# Patient Record
Sex: FEMALE | Race: BLACK OR AFRICAN AMERICAN | Employment: FULL TIME | URBAN - METROPOLITAN AREA
[De-identification: names, ages, dates, MRNs, and addresses within clinical notes are randomized per-mention and may not be internally consistent; named-entity substitution may affect disease eponyms.]

---

## 2017-05-29 ENCOUNTER — HOSPITAL ENCOUNTER (EMERGENCY)
Age: 22
Discharge: HOME | End: 2017-05-29
Attending: EMERGENCY MEDICINE
Payer: MEDICAID

## 2017-05-29 VITALS
OXYGEN SATURATION: 100 % | RESPIRATION RATE: 18 BRPM | SYSTOLIC BLOOD PRESSURE: 140 MMHG | DIASTOLIC BLOOD PRESSURE: 87 MMHG | HEART RATE: 69 BPM | TEMPERATURE: 98.1 F | WEIGHT: 135 LBS | BODY MASS INDEX: 25.51 KG/M2

## 2017-05-29 DIAGNOSIS — J02.9 ACUTE PHARYNGITIS, UNSPECIFIED ETIOLOGY: Primary | ICD-10-CM

## 2017-05-29 PROCEDURE — 99283 EMERGENCY DEPT VISIT LOW MDM: CPT

## 2017-05-29 PROCEDURE — 74011250637 HC RX REV CODE- 250/637: Performed by: EMERGENCY MEDICINE

## 2017-05-29 RX ORDER — AMOXICILLIN 500 MG/1
500 TABLET, FILM COATED ORAL 3 TIMES DAILY
Qty: 30 TAB | Refills: 0 | Status: SHIPPED | OUTPATIENT
Start: 2017-05-29 | End: 2021-04-01

## 2017-05-29 RX ORDER — AMOXICILLIN 250 MG/1
500 CAPSULE ORAL
Status: COMPLETED | OUTPATIENT
Start: 2017-05-29 | End: 2017-05-29

## 2017-05-29 RX ADMIN — AMOXICILLIN 500 MG: 250 CAPSULE ORAL at 05:51

## 2017-05-29 NOTE — ED PROVIDER NOTES
HPI Comments: Cl Salazar is a 24 y.o. female with pertinent PMHx of asthma presenting ambulatory to the ED c/o aching sore throat x 3 days. Pt states that her pain is increased with swallowing. Pt notes associated symptoms of congestion, bilateral ear pain, headache, generalized myalgias and intermittent rhinorrhea. Pt denies any regular use of medications or any known allergies to medications. Pt denies any history of HTN or DM. Pt denies any chance of pregnancy. Pt specifically denies any cough, SOB or measured fevers. PCP: Kathryn Rosario MD  Social Hx: + tobacco use, - alcohol use, - illicit drug use    There are no other complaints, changes, or physical findings at this time. The history is provided by the patient. No  was used. Past Medical History:   Diagnosis Date    ADD (attention deficit disorder)     Asthma     grew out of it per pt       Past Surgical History:   Procedure Laterality Date    HX  SECTION           Family History:   Problem Relation Age of Onset    Psychiatric Disorder Mother        Social History     Social History    Marital status: SINGLE     Spouse name: N/A    Number of children: N/A    Years of education: N/A     Occupational History    Not on file. Social History Main Topics    Smoking status: Current Every Day Smoker    Smokeless tobacco: Never Used    Alcohol use No      Comment: 3 black and milds a day    Drug use: No    Sexual activity: Not Currently     Other Topics Concern    Not on file     Social History Narrative         ALLERGIES: Review of patient's allergies indicates no known allergies. Review of Systems   Constitutional: Negative for appetite change, chills, diaphoresis, fatigue and fever. HENT: Positive for congestion, ear pain and rhinorrhea. Negative for sore throat and trouble swallowing. Respiratory: Negative for cough and shortness of breath. Cardiovascular: Negative for chest pain. Gastrointestinal: Negative for abdominal pain, diarrhea, nausea and vomiting. Genitourinary: Negative for difficulty urinating, dysuria and frequency. Musculoskeletal: Positive for myalgias (generalized myalgias). Negative for arthralgias and back pain. Skin: Negative for color change and rash. Neurological: Positive for headaches. Negative for weakness and numbness. Hematological: Does not bruise/bleed easily. All other systems reviewed and are negative. Vitals:    05/29/17 0531   BP: 140/87   Pulse: 69   Resp: 18   Temp: 98.1 °F (36.7 °C)   SpO2: 100%   Weight: 61.2 kg (135 lb)            Physical Exam   Constitutional: She is oriented to person, place, and time. She appears well-developed and well-nourished. No distress. HENT:   Head: Normocephalic and atraumatic. Mouth/Throat: Posterior oropharyngeal erythema present. No oropharyngeal exudate. Neck: Normal range of motion and full passive range of motion without pain. Neck supple. Mild cervical adenopathy and TTP   Cardiovascular: Normal rate, regular rhythm, normal heart sounds, intact distal pulses and normal pulses. Exam reveals no gallop and no friction rub. No murmur heard. Pulmonary/Chest: Effort normal and breath sounds normal. No accessory muscle usage. No respiratory distress. She has no decreased breath sounds. She has no wheezes. She has no rhonchi. She has no rales. Abdominal: Soft. Bowel sounds are normal. She exhibits no distension. There is no tenderness. There is no rebound, no guarding and no CVA tenderness. Musculoskeletal: Normal range of motion. She exhibits tenderness. She exhibits no edema. Thoracic back: She exhibits no tenderness and no bony tenderness. Lumbar back: She exhibits no tenderness and no bony tenderness. Mild cervical adenopathy and TTP   Lymphadenopathy:     She has cervical adenopathy (mild). Neurological: She is alert and oriented to person, place, and time.  She has normal strength. She is not disoriented. No cranial nerve deficit or sensory deficit. No focal deficits; 5/5 muscle strength in all extremities   Skin: Skin is warm. No lesion and no rash noted. Rash is not nodular. She is not diaphoretic. Nursing note and vitals reviewed. MDM  Number of Diagnoses or Management Options  Diagnosis management comments: DDx: possible strep throat, possible viral illness, unlikely infectious mononucleosis            Amount and/or Complexity of Data Reviewed  Review and summarize past medical records: yes    Patient Progress  Patient progress: stable    ED Course       Procedures    5:40 AM - Discussed the risks of smoking and the benefits of smoking cessation as well as the long term sequelae of smoking with the pt. The patient verbalized their understanding. MEDICATIONS GIVEN:  Medications   amoxicillin (AMOXIL) capsule 500 mg (not administered)       IMPRESSION:  1. Acute pharyngitis, unspecified etiology        PLAN:  1. Current Discharge Medication List      START taking these medications    Details   amoxicillin 500 mg tab Take 500 mg by mouth three (3) times daily. Qty: 30 Tab, Refills: 0         CONTINUE these medications which have NOT CHANGED    Details   albuterol (PROVENTIL VENTOLIN) 2.5 mg /3 mL (0.083 %) nebulizer solution Take 3 mL by inhalation every four (4) hours as needed for Wheezing. Qty: 25 Each, Refills: 2    Associated Diagnoses: Asthma      albuterol (PROVENTIL HFA, VENTOLIN HFA) 90 mcg/actuation inhaler Take 2 Puffs by inhalation daily as needed. 2.   Follow-up Information     Follow up With Details Comments [address], MD   [address] [facility] [address]  [phone]      Shaina Bentley MD   [address] [facility] and [address] [place] [address].  [phone]          Return to ED if worse   DISCHARGE NOTE:  5:49 AM  The patient is ready for discharge.  The patient's signs, symptoms, diagnosis, and discharge instructions have been discussed and the patient and/or family has conveyed their understanding. The patient and/or family is to follow up as recommended or return to the ER should their symptoms worsen. Plan has been discussed and the patient and/or family is in agreement. Written by Oni Rosario, ED Scribe, as dictated by Macie Roger MD.     Attestation: This note is prepared by Gordon Buerger. Kalie Rosario, acting as Scribe for Macie Roger MD.    Macie Roger MD: The scribe's documentation has been prepared under my direction and personally reviewed by me in its entirety. I confirm that the note above accurately reflects all work, treatment, procedures, and medical decision making performed by me.

## 2017-05-29 NOTE — ED NOTES
Patient (s) was given copy of dc instructions and one paper script(s) and no electronic scripts. Patient (s) has verbalized understanding of instructions and script (s). Patient was given a current medication reconciliation form and verbalized understanding of their medications. Patient (s) has verbalized understanding of the importance of discussing medications with  his or her physician or clinic they will be following up with. Patient alert and oriented and in no acute distress. Patient offered wheelchair from treatment area to hospital entrance, patient declined wheelchair. Patient left ED with self.

## 2017-05-29 NOTE — DISCHARGE INSTRUCTIONS
Sore Throat: Care Instructions  Your Care Instructions    Infection by bacteria or a virus causes most sore throats. Cigarette smoke, dry air, air pollution, allergies, and yelling can also cause a sore throat. Sore throats can be painful and annoying. Fortunately, most sore throats go away on their own. If you have a bacterial infection, your doctor may prescribe antibiotics. Follow-up care is a key part of your treatment and safety. Be sure to make and go to all appointments, and call your doctor if you are having problems. It's also a good idea to know your test results and keep a list of the medicines you take. How can you care for yourself at home? · If your doctor prescribed antibiotics, take them as directed. Do not stop taking them just because you feel better. You need to take the full course of antibiotics. · Gargle with warm salt water once an hour to help reduce swelling and relieve discomfort. Use 1 teaspoon of salt mixed in 1 cup of warm water. · Take an over-the-counter pain medicine, such as acetaminophen (Tylenol), ibuprofen (Advil, Motrin), or naproxen (Aleve). Read and follow all instructions on the label. · Be careful when taking over-the-counter cold or flu medicines and Tylenol at the same time. Many of these medicines have acetaminophen, which is Tylenol. Read the labels to make sure that you are not taking more than the recommended dose. Too much acetaminophen (Tylenol) can be harmful. · Drink plenty of fluids. Fluids may help soothe an irritated throat. Hot fluids, such as tea or soup, may help decrease throat pain. · Use over-the-counter throat lozenges to soothe pain. Regular cough drops or hard candy may also help. These should not be given to young children because of the risk of choking. · Do not smoke or allow others to smoke around you. If you need help quitting, talk to your doctor about stop-smoking programs and medicines.  These can increase your chances of quitting for good. · Use a vaporizer or humidifier to add moisture to your bedroom. Follow the directions for cleaning the machine. When should you call for help? Call your doctor now or seek immediate medical care if:  · You have new or worse trouble swallowing. · Your sore throat gets much worse on one side. Watch closely for changes in your health, and be sure to contact your doctor if you do not get better as expected. Where can you learn more? Go to http://lorraine-scooby.info/. Enter [phone] in the search box to learn more about \"Sore Throat: Care Instructions. \"  Current as of: July 29, 2016  Content Version: 11.2  © 7578-0481 [employer]. Care instructions adapted under license by [employer] (which disclaims liability or warranty for this information). If you have questions about a medical condition or this instruction, always ask your healthcare professional. [address] any warranty or liability for your use of this information.

## 2017-05-29 NOTE — ED NOTES
Pt in ED w/ complaint of sore throat, headache, swollen anterior neck w/ pain, headache, and nasal congestion X 3 days. Pt is A&O X 4 and appears in no distress. Emergency Department Nursing Plan of Care       The Nursing Plan of Care is developed from the Nursing assessment and Emergency Department Attending provider initial evaluation. The plan of care may be reviewed in the ED Provider note.     The Plan of Care was developed with the following considerations:   Patient / Family readiness to learn indicated by:verbalized understanding  Persons(s) to be included in education: patient  Barriers to Learning/Limitations:No    Signed     Chadwick Singleton RN    5/29/2017   5:48 AM

## 2017-12-26 ENCOUNTER — HOSPITAL ENCOUNTER (EMERGENCY)
Age: 22
Discharge: HOME | End: 2017-12-26
Attending: EMERGENCY MEDICINE
Payer: MEDICAID

## 2017-12-26 VITALS
OXYGEN SATURATION: 100 % | DIASTOLIC BLOOD PRESSURE: 55 MMHG | RESPIRATION RATE: 16 BRPM | WEIGHT: 132 LBS | HEART RATE: 75 BPM | BODY MASS INDEX: 24.29 KG/M2 | HEIGHT: 62 IN | TEMPERATURE: 97.6 F | SYSTOLIC BLOOD PRESSURE: 104 MMHG

## 2017-12-26 DIAGNOSIS — O21.9 NAUSEA/VOMITING IN PREGNANCY: Primary | ICD-10-CM

## 2017-12-26 LAB
ANION GAP SERPL CALC-SCNC: 9 MMOL/L (ref 5–15)
APPEARANCE UR: ABNORMAL
BACTERIA URNS QL MICRO: ABNORMAL /HPF
BASOPHILS # BLD: 0 K/UL (ref 0–0.1)
BASOPHILS NFR BLD: 0 % (ref 0–1)
BILIRUB UR QL: NEGATIVE
BUN SERPL-MCNC: 8 MG/DL (ref 6–20)
BUN/CREAT SERPL: 12 (ref 12–20)
CALCIUM SERPL-MCNC: 8.7 MG/DL (ref 8.5–10.1)
CHLORIDE SERPL-SCNC: 102 MMOL/L (ref 97–108)
CO2 SERPL-SCNC: 23 MMOL/L (ref 21–32)
COLOR UR: ABNORMAL
CREAT SERPL-MCNC: 0.65 MG/DL (ref 0.55–1.02)
EOSINOPHIL # BLD: 0.2 K/UL (ref 0–0.4)
EOSINOPHIL NFR BLD: 3 % (ref 0–7)
EPITH CASTS URNS QL MICRO: ABNORMAL /LPF
ERYTHROCYTE [DISTWIDTH] IN BLOOD BY AUTOMATED COUNT: 12.2 % (ref 11.5–14.5)
GLUCOSE SERPL-MCNC: 85 MG/DL (ref 65–100)
GLUCOSE UR STRIP.AUTO-MCNC: NEGATIVE MG/DL
HCG SERPL-ACNC: ABNORMAL MIU/ML (ref 0–6)
HCG UR QL: POSITIVE
HCT VFR BLD AUTO: 32.6 % (ref 35–47)
HGB BLD-MCNC: 10.7 G/DL (ref 11.5–16)
HGB UR QL STRIP: NEGATIVE
KETONES UR QL STRIP.AUTO: NEGATIVE MG/DL
LEUKOCYTE ESTERASE UR QL STRIP.AUTO: NEGATIVE
LYMPHOCYTES # BLD: 2.1 K/UL (ref 0.8–3.5)
LYMPHOCYTES NFR BLD: 38 % (ref 12–49)
MCH RBC QN AUTO: 28.3 PG (ref 26–34)
MCHC RBC AUTO-ENTMCNC: 32.8 G/DL (ref 30–36.5)
MCV RBC AUTO: 86.2 FL (ref 80–99)
MONOCYTES # BLD: 0.7 K/UL (ref 0–1)
MONOCYTES NFR BLD: 13 % (ref 5–13)
MUCOUS THREADS URNS QL MICRO: ABNORMAL /LPF
NEUTS SEG # BLD: 2.6 K/UL (ref 1.8–8)
NEUTS SEG NFR BLD: 46 % (ref 32–75)
NITRITE UR QL STRIP.AUTO: NEGATIVE
PH UR STRIP: 7 [PH] (ref 5–8)
PLATELET # BLD AUTO: 216 K/UL (ref 150–400)
POTASSIUM SERPL-SCNC: 4.1 MMOL/L (ref 3.5–5.1)
PROT UR STRIP-MCNC: NEGATIVE MG/DL
RBC # BLD AUTO: 3.78 M/UL (ref 3.8–5.2)
RBC #/AREA URNS HPF: ABNORMAL /HPF (ref 0–5)
SODIUM SERPL-SCNC: 134 MMOL/L (ref 136–145)
SP GR UR REFRACTOMETRY: 1.02 (ref 1–1.03)
UA: UC IF INDICATED,UAUC: ABNORMAL
UROBILINOGEN UR QL STRIP.AUTO: 0.2 EU/DL (ref 0.2–1)
WBC # BLD AUTO: 5.6 K/UL (ref 3.6–11)
WBC URNS QL MICRO: ABNORMAL /HPF (ref 0–4)

## 2017-12-26 PROCEDURE — 96365 THER/PROPH/DIAG IV INF INIT: CPT

## 2017-12-26 PROCEDURE — 74011250636 HC RX REV CODE- 250/636: Performed by: EMERGENCY MEDICINE

## 2017-12-26 PROCEDURE — 74011000258 HC RX REV CODE- 258: Performed by: EMERGENCY MEDICINE

## 2017-12-26 PROCEDURE — 84702 CHORIONIC GONADOTROPIN TEST: CPT | Performed by: EMERGENCY MEDICINE

## 2017-12-26 PROCEDURE — 81001 URINALYSIS AUTO W/SCOPE: CPT | Performed by: EMERGENCY MEDICINE

## 2017-12-26 PROCEDURE — 87086 URINE CULTURE/COLONY COUNT: CPT | Performed by: EMERGENCY MEDICINE

## 2017-12-26 PROCEDURE — 99283 EMERGENCY DEPT VISIT LOW MDM: CPT

## 2017-12-26 PROCEDURE — 81025 URINE PREGNANCY TEST: CPT

## 2017-12-26 PROCEDURE — 96361 HYDRATE IV INFUSION ADD-ON: CPT

## 2017-12-26 PROCEDURE — 36415 COLL VENOUS BLD VENIPUNCTURE: CPT | Performed by: EMERGENCY MEDICINE

## 2017-12-26 PROCEDURE — 80048 BASIC METABOLIC PNL TOTAL CA: CPT | Performed by: EMERGENCY MEDICINE

## 2017-12-26 PROCEDURE — 85025 COMPLETE CBC W/AUTO DIFF WBC: CPT | Performed by: EMERGENCY MEDICINE

## 2017-12-26 RX ORDER — PROMETHAZINE HYDROCHLORIDE 25 MG/1
25 TABLET ORAL
Qty: 12 TAB | Refills: 0 | Status: SHIPPED | OUTPATIENT
Start: 2017-12-26 | End: 2021-04-01

## 2017-12-26 RX ORDER — DEXTROSE MONOHYDRATE AND SODIUM CHLORIDE 5; .9 G/100ML; G/100ML
250 INJECTION, SOLUTION INTRAVENOUS CONTINUOUS
Status: DISCONTINUED | OUTPATIENT
Start: 2017-12-26 | End: 2017-12-27 | Stop reason: HOSPADM

## 2017-12-26 RX ORDER — SODIUM CHLORIDE 0.9 % (FLUSH) 0.9 %
5-10 SYRINGE (ML) INJECTION EVERY 8 HOURS
Status: DISCONTINUED | OUTPATIENT
Start: 2017-12-26 | End: 2017-12-27 | Stop reason: HOSPADM

## 2017-12-26 RX ORDER — SODIUM CHLORIDE 0.9 % (FLUSH) 0.9 %
5-10 SYRINGE (ML) INJECTION AS NEEDED
Status: DISCONTINUED | OUTPATIENT
Start: 2017-12-26 | End: 2017-12-27 | Stop reason: HOSPADM

## 2017-12-26 RX ADMIN — PROMETHAZINE HYDROCHLORIDE 12.5 MG: 25 INJECTION INTRAMUSCULAR; INTRAVENOUS at 19:46

## 2017-12-26 RX ADMIN — DEXTROSE MONOHYDRATE AND SODIUM CHLORIDE 250 ML/HR: 5; .9 INJECTION, SOLUTION INTRAVENOUS at 19:49

## 2017-12-26 NOTE — LETTER
[facility] EMERGENCY DEPT 
[address] [facility] [place] [address] [address] 
[phone] Work/School Note Date: 12/26/2017 To Whom It May concern: 
 
Jimmy Vanessa was seen and treated today in the emergency room by the following provider(s): 
Attending Provider: Edison Godwin MD. Jimmy Vanessa may return to work on 10/28/17. Sincerely, Alma Rosa Bauer RN

## 2017-12-27 NOTE — ED NOTES
Pt presents ambulatory to ED complaining of nausea and vomiting X1 week. Pt reports she also needs a work note. Pt is alert and oriented x 4, RR even and unlabored, skin is warm and dry. Assesment completed and pt updated on plan of care. Emergency Department Nursing Plan of Care       The Nursing Plan of Care is developed from the Nursing assessment and Emergency Department Attending provider initial evaluation. The plan of care may be reviewed in the ED Provider note.     The Plan of Care was developed with the following considerations:   Patient / Family readiness to learn indicated by:verbalized understanding  Persons(s) to be included in education: patient  Barriers to Learning/Limitations:No    Signed     Abdirizak Mills RN    12/26/2017   7:10 PM

## 2017-12-27 NOTE — ED PROVIDER NOTES
EMERGENCY DEPARTMENT HISTORY AND PHYSICAL EXAM      Date: 12/26/2017  Patient Name: Remington Garcia    History of Presenting Illness     Chief Complaint   Patient presents with    Abdominal Pain     x 1 week with nausea, vomiting, and diarrhea       History Provided By: Patient    HPI: Remington Garcia, 25 y.o. female, presents ambulatory to the ED with cc of persistent suprapubic pain with nausea and vomiting x 1 week. She indicates she has been unable to tolerate any PO since onset of sx's. Pt reports her LMP was two weeks ago, however it was shorter and much lighter than usual. She denies any vaginal discharge or bleeding currently. Pt reports nausea/vomiting in her prior pregnancy, however states not to the extent of current sx's. Pt is otherwise healthy and denies any long standing illnesses or medications. PCP: Michael Escalante MD    There are no other complaints, changes, or physical findings at this time. Current Facility-Administered Medications   Medication Dose Route Frequency Provider Last Rate Last Dose    sodium chloride (NS) flush 5-10 mL  5-10 mL IntraVENous Q8H Jose De Jesus Jacobo MD        sodium chloride (NS) flush 5-10 mL  5-10 mL IntraVENous PRN Jose De Jesus Jacobo MD        dextrose 5% and 0.9% NaCl infusion  250 mL/hr IntraVENous CONTINUOUS Jose De Jesus Jacobo MD 1,000 mL/hr at 12/26/17 2017 1,000 mL/hr at 12/26/17 2017     Current Outpatient Prescriptions   Medication Sig Dispense Refill    promethazine (PHENERGAN) 25 mg tablet Take 1 Tab by mouth every six (6) hours as needed for Nausea. 12 Tab 0    amoxicillin 500 mg tab Take 500 mg by mouth three (3) times daily. 30 Tab 0    albuterol (PROVENTIL VENTOLIN) 2.5 mg /3 mL (0.083 %) nebulizer solution Take 3 mL by inhalation every four (4) hours as needed for Wheezing. 25 Each 2    albuterol (PROVENTIL HFA, VENTOLIN HFA) 90 mcg/actuation inhaler Take 2 Puffs by inhalation daily as needed.            Past History     Past Medical History:  Past Medical History:   Diagnosis Date    ADD (attention deficit disorder)     Asthma     grew out of it per pt       Past Surgical History:  Past Surgical History:   Procedure Laterality Date    HX  SECTION         Family History:  Family History   Problem Relation Age of Onset    Psychiatric Disorder Mother        Social History:  Social History   Substance Use Topics    Smoking status: Current Every Day Smoker    Smokeless tobacco: Never Used    Alcohol use No      Comment: 3 black and milds a day       Allergies:  No Known Allergies      Review of Systems   Review of Systems   Constitutional: Negative for fever. HENT: Negative for sore throat. Eyes: Negative for photophobia and redness. Respiratory: Negative for shortness of breath and wheezing. Cardiovascular: Negative for chest pain and leg swelling. Gastrointestinal: Positive for abdominal pain (suprapubic), nausea and vomiting. Negative for blood in stool. Genitourinary: Negative for difficulty urinating, dysuria, hematuria, menstrual problem and vaginal bleeding. Musculoskeletal: Negative for back pain and joint swelling. Neurological: Negative for dizziness, seizures, syncope, speech difficulty, weakness, numbness and headaches. Hematological: Negative for adenopathy. Psychiatric/Behavioral: Negative for agitation, confusion and suicidal ideas. The patient is not nervous/anxious. Physical Exam   Physical Exam   Constitutional: She is oriented to person, place, and time. She appears well-developed and well-nourished. No distress. HENT:   Head: Normocephalic and atraumatic. Mouth/Throat: Oropharynx is clear and moist. No oropharyngeal exudate. Eyes: Conjunctivae and EOM are normal. Pupils are equal, round, and reactive to light. Left eye exhibits no discharge. Neck: Normal range of motion. Neck supple. No JVD present. Cardiovascular: Normal rate, regular rhythm, normal heart sounds and intact distal pulses. Pulmonary/Chest: Effort normal and breath sounds normal. No respiratory distress. She has no wheezes. Abdominal: Soft. Bowel sounds are normal. She exhibits no distension. There is no tenderness. There is no rebound and no guarding. Musculoskeletal: Normal range of motion. She exhibits no edema or tenderness. Lymphadenopathy:     She has no cervical adenopathy. Neurological: She is alert and oriented to person, place, and time. She has normal reflexes. No cranial nerve deficit. Skin: Skin is warm and dry. No rash noted. Psychiatric: She has a normal mood and affect. Her behavior is normal.   Nursing note and vitals reviewed.         Diagnostic Study Results     Labs -     Recent Results (from the past 12 hour(s))   HCG URINE, QL. - POC    Collection Time: 12/26/17  6:36 PM   Result Value Ref Range    Pregnancy test,urine (POC) POSITIVE (A) NEG     URINALYSIS W/ REFLEX CULTURE    Collection Time: 12/26/17  6:37 PM   Result Value Ref Range    Color YELLOW/STRAW      Appearance CLOUDY (A) CLEAR      Specific gravity 1.025 1.003 - 1.030      pH (UA) 7.0 5.0 - 8.0      Protein NEGATIVE  NEG mg/dL    Glucose NEGATIVE  NEG mg/dL    Ketone NEGATIVE  NEG mg/dL    Bilirubin NEGATIVE  NEG      Blood NEGATIVE  NEG      Urobilinogen 0.2 0.2 - 1.0 EU/dL    Nitrites NEGATIVE  NEG      Leukocyte Esterase NEGATIVE  NEG      WBC 0-4 0 - 4 /hpf    RBC 0-5 0 - 5 /hpf    Epithelial cells MANY (A) FEW /lpf    Bacteria 1+ (A) NEG /hpf    UA:UC IF INDICATED URINE CULTURE ORDERED (A) CNI      Mucus 1+ (A) NEG /lpf   CBC WITH AUTOMATED DIFF    Collection Time: 12/26/17  7:21 PM   Result Value Ref Range    WBC 5.6 3.6 - 11.0 K/uL    RBC 3.78 (L) 3.80 - 5.20 M/uL    HGB 10.7 (L) 11.5 - 16.0 g/dL    HCT 32.6 (L) 35.0 - 47.0 %    MCV 86.2 80.0 - 99.0 FL    MCH 28.3 26.0 - 34.0 PG    MCHC 32.8 30.0 - 36.5 g/dL    RDW 12.2 11.5 - 14.5 %    PLATELET 416 333 - 484 K/uL    NEUTROPHILS 46 32 - 75 %    LYMPHOCYTES 38 12 - 49 %    MONOCYTES 13 5 - 13 %    EOSINOPHILS 3 0 - 7 %    BASOPHILS 0 0 - 1 %    ABS. NEUTROPHILS 2.6 1.8 - 8.0 K/UL    ABS. LYMPHOCYTES 2.1 0.8 - 3.5 K/UL    ABS. MONOCYTES 0.7 0.0 - 1.0 K/UL    ABS. EOSINOPHILS 0.2 0.0 - 0.4 K/UL    ABS. BASOPHILS 0.0 0.0 - 0.1 K/UL   METABOLIC PANEL, BASIC    Collection Time: 12/26/17  7:21 PM   Result Value Ref Range    Sodium 134 (L) 136 - 145 mmol/L    Potassium 4.1 3.5 - 5.1 mmol/L    Chloride 102 97 - 108 mmol/L    CO2 23 21 - 32 mmol/L    Anion gap 9 5 - 15 mmol/L    Glucose 85 65 - 100 mg/dL    BUN 8 6 - 20 MG/DL    Creatinine 0.65 0.55 - 1.02 MG/DL    BUN/Creatinine ratio 12 12 - 20      GFR est AA >60 >60 ml/min/1.73m2    GFR est non-AA >60 >60 ml/min/1.73m2    Calcium 8.7 8.5 - 10.1 MG/DL   BETA HCG, QT    Collection Time: 12/26/17  7:21 PM   Result Value [place] [address] (H) 0 - 6 MIU/ML       Medical Decision Making   I am the first provider for this patient. I reviewed the vital signs, available nursing notes, past medical history, past surgical history, family history and social history. Vital Signs-Reviewed the patient's vital signs. Patient Vitals for the past 12 hrs:   Temp Pulse Resp BP SpO2   12/26/17 1823 97.6 °F (36.4 °C) 75 16 104/55 100 %       Records Reviewed: Nursing Notes and Old Medical Records    Provider Notes (Medical Decision Making):     DDx: hyperemesis in pregnancy, gastroenteritis, dehydration, UTI    ED Course:   Initial assessment performed. The patients presenting problems have been discussed, and they are in agreement with the care plan formulated and outlined with them. I have encouraged them to ask questions as they arise throughout their visit. Disposition:    DISCHARGE NOTE:  8:19 PM  The patient is ready for discharge. The patients signs, symptoms, diagnosis, and instructions for discharge have been discussed and the pt has conveyed their understanding.  The patient is to follow up as recommended with PCP or return to the ER should their symptoms worsen. Plan has been discussed and patient has conveyed their agreement. PLAN:  1. Discharge home  Current Discharge Medication List      START taking these medications    Details   promethazine (PHENERGAN) 25 mg tablet Take 1 Tab by mouth every six (6) hours as needed for Nausea. Qty: 12 Tab, Refills: 0           2. Follow-up Information     Follow up With Details Comments Contact Info    [facility] In 3 days  [address] [address]  [phone]        Return to ED if worse     Diagnosis     Clinical Impression:   1. Nausea/vomiting in pregnancy        Attestations: This note is prepared by Rae Lerma, acting as Scribe for Juan Oconnor MD.    Juan Oconnor MD: The scribe's documentation has been prepared under my direction and personally reviewed by me in its entirety. I confirm that the note above accurately reflects all work, treatment, procedures, and medical decision making performed by me.

## 2017-12-27 NOTE — ED NOTES
Discharge instructions were given to the patient by Dorian Ovalles RN. The patient left the Emergency Department ambulatory, alert and oriented and in no acute distress with 1 prescription. The patient was encouraged to call or return to the ED for worsening issues or problems and was encouraged to schedule a follow up appointment for continuing care. The patient verbalized understanding of discharge instructions and prescriptions, all questions were answered. The patient has no further concerns at this time.

## 2017-12-27 NOTE — DISCHARGE INSTRUCTIONS
Managing Morning Sickness: Care Instructions  Your Care Instructions    For many women, the toughest part of early pregnancy is morning sickness. Morning sickness can range from mild nausea to severe nausea with bouts of vomiting. Symptoms may be worse in the morning, although they can strike at any time of the day or night. If you have nausea, vomiting, or both, look for safe measures that can bring you relief. You can take simple steps at home to manage morning sickness. These steps include changing what and when you eat and avoiding certain foods and smells. Some women find that acupuncture and acupressure wristbands also help. Follow-up care is a key part of your treatment and safety. Be sure to make and go to all appointments, and call your doctor if you are having problems. It's also a good idea to know your test results and keep a list of the medicines you take. How can you care for yourself at home? · Keep food in your stomach, but not too much at once. Your nausea may be worse if your stomach is empty. Eat five or six small meals a day instead of three large meals. · For morning nausea, eat a small snack, such as a couple of crackers or dry biscuits, before rising. Allow a few minutes for your stomach to settle before you get out of bed slowly. · Drink plenty of fluids, enough so that your urine is light yellow or clear like water. If you have kidney, heart, or liver disease and have to limit fluids, talk with your doctor before you increase the amount of fluids you drink. Some women find that peppermint tea helps with nausea. · Eat more protein, such as chicken, fish, lean meat, beans, nuts, and seeds. · Eat carbohydrate foods, such as potatoes, whole-grain cereals, rice, and pasta. · Avoid smells and foods that make you feel nauseated. Spicy or high-fat foods, citrus juice, milk, coffee, and tea with caffeine often make nausea worse. · Do not drink alcohol. · Do not smoke.  Try not to be around others who smoke. If you need help quitting, talk to your doctor about stop-smoking programs and medicines. These can increase your chances of quitting for good. · If you are taking iron supplements, ask your doctor if they are necessary. Iron can make nausea worse. · Get lots of rest. Stress and fatigue can make your morning sickness worse. · Ask your doctor about taking prescription medicine, or over-the-counter products such as vitamin B6, doxylamine, or charlotte, to relieve your symptoms. Your doctor can tell you the doses that are safe for you. · Take your prenatal vitamins at night on a full stomach. When should you call for help? Call 911 anytime you think you may need emergency care. For example, call if:  ? · You passed out (lost consciousness). ?Call your doctor now or seek immediate medical care if:  ? · You are sick to your stomach or cannot drink fluids. ? · You have symptoms of dehydration, such as:  ¨ Dry eyes and a dry mouth. ¨ Passing only a little urine. ¨ Feeling thirstier than usual.   ? · You are not able to keep down your medicine. ? · You have pain in your belly or pelvis. ? Watch closely for changes in your health, and be sure to contact your doctor if:  ? · You do not get better as expected. Where can you learn more? Go to http://lorraine-scooby.info/. Enter Y024 in the search box to learn more about \"Managing Morning Sickness: Care Instructions. \"  Current as of: March 16, 2017  Content Version: 11.4  © 1362-7914 Healthwise, [employer]. Care instructions adapted under license by [employer] (which disclaims liability or warranty for this information). If you have questions about a medical condition or this instruction, always ask your healthcare professional. [address] any warranty or liability for your use of this information.

## 2017-12-28 LAB
BACTERIA SPEC CULT: NORMAL
CC UR VC: NORMAL
SERVICE CMNT-IMP: NORMAL

## 2018-03-07 ENCOUNTER — HOSPITAL ENCOUNTER (EMERGENCY)
Age: 23
Discharge: HOME | End: 2018-03-07
Attending: EMERGENCY MEDICINE
Payer: MEDICAID

## 2018-03-07 VITALS
DIASTOLIC BLOOD PRESSURE: 72 MMHG | TEMPERATURE: 97.7 F | RESPIRATION RATE: 16 BRPM | OXYGEN SATURATION: 100 % | HEIGHT: 62 IN | WEIGHT: 133 LBS | BODY MASS INDEX: 24.48 KG/M2 | HEART RATE: 87 BPM | SYSTOLIC BLOOD PRESSURE: 125 MMHG

## 2018-03-07 DIAGNOSIS — J06.9 ACUTE UPPER RESPIRATORY INFECTION: Primary | ICD-10-CM

## 2018-03-07 LAB — DEPRECATED S PYO AG THROAT QL EIA: NEGATIVE

## 2018-03-07 PROCEDURE — 87880 STREP A ASSAY W/OPTIC: CPT | Performed by: PHYSICIAN ASSISTANT

## 2018-03-07 PROCEDURE — 99283 EMERGENCY DEPT VISIT LOW MDM: CPT

## 2018-03-07 PROCEDURE — 74011250637 HC RX REV CODE- 250/637: Performed by: PHYSICIAN ASSISTANT

## 2018-03-07 PROCEDURE — 87070 CULTURE OTHR SPECIMN AEROBIC: CPT | Performed by: EMERGENCY MEDICINE

## 2018-03-07 RX ORDER — ACETAMINOPHEN 325 MG/1
650 TABLET ORAL
Qty: 20 TAB | Refills: 0 | Status: SHIPPED | OUTPATIENT
Start: 2018-03-07 | End: 2021-04-01

## 2018-03-07 RX ORDER — ACETAMINOPHEN 325 MG/1
650 TABLET ORAL
Status: COMPLETED | OUTPATIENT
Start: 2018-03-07 | End: 2018-03-07

## 2018-03-07 RX ADMIN — ACETAMINOPHEN 650 MG: 325 TABLET, FILM COATED ORAL at 09:22

## 2018-03-07 NOTE — LETTER
[facility] EMERGENCY DEPT 
[address] [facility] [address] [id number] [phone] Work/School Note Date: 3/7/2018 To Whom It May concern: 
 
Sapphire Kan was seen and treated today in the emergency room by the following provider(s): 
Attending Provider: Leanna Blake MD 
Physician Assistant: Aj Mckeon PA-C. Sapphire Kan {Return to work:03/09/2018 Sincerely, 
 
 
 
 
Cornelia Sams RN

## 2018-03-07 NOTE — ED PROVIDER NOTES
EMERGENCY DEPARTMENT HISTORY AND PHYSICAL EXAM    Date: 3/7/2018  Patient Name: Sapphire Kan    History of Presenting Illness     Chief Complaint   Patient presents with    Cough         History Provided By: Patient      HPI: Sapphire Kan is a 25 y.o. female with a PMH of asthma and ADHD who presents with acute mild dry cough, nasal congestion, sore throat, rhinorrhea, postnasal drip x 4 days. OTC tea and cold medicine with mild relief. Pt additionally endorses she is pregnant; LMP 2017. She denies fever, chills, n/v, abd pain, urianry sxs, vaginal discharge, vaginal bleeding, ear pain, headache. Pt endorses her son was recently sick at home with similar sxs. PCP: Huong Islas MD    Current Outpatient Prescriptions   Medication Sig Dispense Refill    acetaminophen (TYLENOL) 325 mg tablet Take 2 Tabs by mouth every four (4) hours as needed for Pain. 20 Tab 0    promethazine (PHENERGAN) 25 mg tablet Take 1 Tab by mouth every six (6) hours as needed for Nausea. 12 Tab 0    promethazine (PHENERGAN) 25 mg tablet Take 1 Tab by mouth every six (6) hours as needed for Nausea. 12 Tab 0    amoxicillin 500 mg tab Take 500 mg by mouth three (3) times daily. 30 Tab 0    albuterol (PROVENTIL VENTOLIN) 2.5 mg /3 mL (0.083 %) nebulizer solution Take 3 mL by inhalation every four (4) hours as needed for Wheezing. 25 Each 2    albuterol (PROVENTIL HFA, VENTOLIN HFA) 90 mcg/actuation inhaler Take 2 Puffs by inhalation daily as needed.            Past History     Past Medical History:  Past Medical History:   Diagnosis Date    ADD (attention deficit disorder)     Asthma     grew out of it per pt       Past Surgical History:  Past Surgical History:   Procedure Laterality Date    HX  SECTION         Family History:  Family History   Problem Relation Age of Onset    Psychiatric Disorder Mother        Social History:  Social History   Substance Use Topics    Smoking status: Current Every Day Smoker    Smokeless tobacco: Never Used    Alcohol use No      Comment: 3 black and milds a day       Allergies:  No Known Allergies      Review of Systems   Review of Systems   Constitutional: Negative for activity change, chills, fatigue and fever. HENT: Positive for congestion, postnasal drip, rhinorrhea and sore throat. Negative for ear discharge, ear pain, sinus pain, sinus pressure, sneezing, trouble swallowing and voice change. Eyes: Negative for pain and redness. Respiratory: Positive for cough. Negative for chest tightness, shortness of breath, wheezing and stridor. Cardiovascular: Positive for chest pain. Gastrointestinal: Negative for abdominal pain, diarrhea, nausea and vomiting. Genitourinary: Negative. Musculoskeletal: Negative for arthralgias and myalgias. Skin: Negative. Negative for rash. Allergic/Immunologic: Negative for environmental allergies. Neurological: Negative for headaches. Psychiatric/Behavioral: Negative. Physical Exam     Vitals:    03/07/18 0812   BP: 125/72   Pulse: 87   Resp: 16   Temp: 97.7 °F (36.5 °C)   SpO2: 100%   Weight: 60.3 kg (133 lb)   Height: 5' 2\" (1.575 m)     Physical Exam   Constitutional: She is oriented to person, place, and time. She appears well-developed and well-nourished. No distress. HENT:   Head: Normocephalic and atraumatic. Right Ear: Hearing, tympanic membrane, external ear and ear canal normal.   Left Ear: Hearing, tympanic membrane, external ear and ear canal normal.   Nose: Mucosal edema and rhinorrhea present. Right sinus exhibits no maxillary sinus tenderness and no frontal sinus tenderness. Left sinus exhibits no maxillary sinus tenderness and no frontal sinus tenderness. Mouth/Throat: Uvula is midline, oropharynx is clear and moist and mucous membranes are normal. No oral lesions. No trismus in the jaw. No uvula swelling.  No oropharyngeal exudate, posterior oropharyngeal edema, posterior oropharyngeal erythema or tonsillar abscesses. Eyes: Conjunctivae and EOM are normal. Pupils are equal, round, and reactive to light. Right eye exhibits no discharge. Left eye exhibits no discharge. No scleral icterus. Neck: Normal range of motion and full passive range of motion without pain. Neck supple. Cardiovascular: Normal rate, regular rhythm, normal heart sounds and intact distal pulses. Exam reveals no gallop and no friction rub. No murmur heard. Pulmonary/Chest: Effort normal and breath sounds normal. No accessory muscle usage. No respiratory distress. She has no decreased breath sounds. She has no wheezes. She has no rhonchi. She has no rales. She exhibits no tenderness. Abdominal: Soft. Bowel sounds are normal. She exhibits no distension and no mass. There is no tenderness. There is no rebound and no guarding. Musculoskeletal: Normal range of motion. Lymphadenopathy:     She has no cervical adenopathy. Neurological: She is alert and oriented to person, place, and time. No cranial nerve deficit. Skin: Skin is warm and dry. No rash noted. She is not diaphoretic. Psychiatric: She has a normal mood and affect. Her behavior is normal. Judgment and thought content normal.   Nursing note and vitals reviewed. Diagnostic Study Results     Labs -     Recent Results (from the past 12 hour(s))   STREP AG SCREEN, GROUP A    Collection Time: 03/07/18  9:22 AM   Result Value Ref Range    Group A Strep Ag ID NEGATIVE  NEG         Radiologic Studies -   No orders to display     CT Results  (Last 48 hours)    None        CXR Results  (Last 48 hours)    None            Medical Decision Making   I am the first provider for this patient. I reviewed the vital signs, available nursing notes, past medical history, past surgical history, family history and social history. Vital Signs-Reviewed the patient's vital signs.     Records Reviewed: Nursing Notes and Old Medical Records    ED Course:     Disposition:    DISCHARGE NOTE: 9:41 AM      Care plan outlined and precautions discussed. Patient has no new complaints, changes, or physical findings. Results of strep test were reviewed with the patient. All medications were reviewed with the patient; will d/c home with tylenol. All of pt's questions and concerns were addressed. Patient was instructed and agrees to follow up with PCP/ OBGYN, as well as to return to the ED upon further deterioration. Patient is ready to go home. Follow-up Information     Follow up With Details Comments Contact Info    Lorie Hurtado MD Schedule an appointment as soon as possible for a visit in 1 week As needed, If symptoms worsen [id number] [facility] [address] [facility] Gynecology Schedule an appointment as soon as possible for a visit in 1 day As needed [address]  [phone]    [facility] Schedule an appointment as soon as possible for a visit in 1 day As needed [address] [address]  [phone]          Current Discharge Medication List      START taking these medications    Details   acetaminophen (TYLENOL) 325 mg tablet Take 2 Tabs by mouth every four (4) hours as needed for Pain. Qty: 20 Tab, Refills: 0         CONTINUE these medications which have NOT CHANGED    Details   ! ! promethazine (PHENERGAN) 25 mg tablet Take 1 Tab by mouth every six (6) hours as needed for Nausea. Qty: 12 Tab, Refills: 0      !! promethazine (PHENERGAN) 25 mg tablet Take 1 Tab by mouth every six (6) hours as needed for Nausea. Qty: 12 Tab, Refills: 0      amoxicillin 500 mg tab Take 500 mg by mouth three (3) times daily. Qty: 30 Tab, Refills: 0      albuterol (PROVENTIL VENTOLIN) 2.5 mg /3 mL (0.083 %) nebulizer solution Take 3 mL by inhalation every four (4) hours as needed for Wheezing.   Qty: 25 Each, Refills: 2    Associated Diagnoses: Asthma      albuterol (PROVENTIL HFA, VENTOLIN HFA) 90 mcg/actuation inhaler Take 2 Puffs by inhalation daily as needed. !! - Potential duplicate medications found. Please discuss with provider. Provider Notes (Medical Decision Making):   DDx: strep, flu, uri, viral illness, tonsillar abscess    Procedures:  Procedures        Diagnosis     Clinical Impression:   1.  Acute upper respiratory infection

## 2018-03-07 NOTE — ED NOTES
As per patient her son had a cold now mom having the same symptoms. + coughing no sore throat, denies N/V/D. Pt is 16 weeks pregnant. Emergency Department Nursing Plan of Care       The Nursing Plan of Care is developed from the Nursing assessment and Emergency Department Attending provider initial evaluation. The plan of care may be reviewed in the ED Provider note.     The Plan of Care was developed with the following considerations:   Patient / Family readiness to learn indicated by:verbalized understanding  Persons(s) to be included in education: patient  Barriers to Learning/Limitations:No    Signed     Richie Barajas RN    3/7/2018   8:34 AM

## 2018-03-07 NOTE — ED NOTES
Pt accepted Dc data and plan of care. Pt left unit steady gait. Patient (s)  given copy of dc instructions and 0 script(s). Patient (s)  verbalized understanding of instructions and script (s). Patient given a current medication reconciliation form and verbalized understanding of their medications. Patient (s) verbalized understanding of the importance of discussing medications with  his or her physician or clinic they will be following up with. Patient alert and oriented and in no acute distress. Patient discharged home ambulatory with self.

## 2018-03-07 NOTE — DISCHARGE INSTRUCTIONS
Upper Respiratory Infection (Cold): Care Instructions  Your Care Instructions    An upper respiratory infection, or URI, is an infection of the nose, sinuses, or throat. URIs are spread by coughs, sneezes, and direct contact. The common cold is the most frequent kind of URI. The flu and sinus infections are other kinds of URIs. Almost all URIs are caused by viruses. Antibiotics won't cure them. But you can treat most infections with home care. This may include drinking lots of fluids and taking over-the-counter pain medicine. You will probably feel better in 4 to 10 days. The doctor has checked you carefully, but problems can develop later. If you notice any problems or new symptoms, get medical treatment right away. Follow-up care is a key part of your treatment and safety. Be sure to make and go to all appointments, and call your doctor if you are having problems. It's also a good idea to know your test results and keep a list of the medicines you take. How can you care for yourself at home? · To prevent dehydration, drink plenty of fluids, enough so that your urine is light yellow or clear like water. Choose water and other caffeine-free clear liquids until you feel better. If you have kidney, heart, or liver disease and have to limit fluids, talk with your doctor before you increase the amount of fluids you drink. · Take an over-the-counter pain medicine, such as acetaminophen (Tylenol), ibuprofen (Advil, Motrin), or naproxen (Aleve). Read and follow all instructions on the label. · Before you use cough and cold medicines, check the label. These medicines may not be safe for young children or for people with certain health problems. · Be careful when taking over-the-counter cold or flu medicines and Tylenol at the same time. Many of these medicines have acetaminophen, which is Tylenol. Read the labels to make sure that you are not taking more than the recommended dose.  Too much acetaminophen (Tylenol) can be harmful. · Get plenty of rest.  · Do not smoke or allow others to smoke around you. If you need help quitting, talk to your doctor about stop-smoking programs and medicines. These can increase your chances of quitting for good. When should you call for help? Call 911 anytime you think you may need emergency care. For example, call if:  ? · You have severe trouble breathing. ?Call your doctor now or seek immediate medical care if:  ? · You seem to be getting much sicker. ? · You have new or worse trouble breathing. ? · You have a new or higher fever. ? · You have a new rash. ? Watch closely for changes in your health, and be sure to contact your doctor if:  ? · You have a new symptom, such as a sore throat, an earache, or sinus pain. ? · You cough more deeply or more often, especially if you notice more mucus or a change in the color of your mucus. ? · You do not get better as expected. Where can you learn more? Go to http://lorraine-scooby.info/. Enter Y257 in the search box to learn more about \"Upper Respiratory Infection (Cold): Care Instructions. \"  Current as of: May 12, 2017  Content Version: 11.4  © 4985-9768 Healthwise, [employer]. Care instructions adapted under license by [employer] (which disclaims liability or warranty for this information). If you have questions about a medical condition or this instruction, always ask your healthcare professional. [place] [address] any warranty or liability for your use of this information.

## 2018-03-09 LAB
BACTERIA SPEC CULT: NORMAL
SERVICE CMNT-IMP: NORMAL

## 2018-04-26 ENCOUNTER — HOSPITAL ENCOUNTER (EMERGENCY)
Age: 23
Discharge: HOME | End: 2018-04-26
Attending: EMERGENCY MEDICINE
Payer: MEDICAID

## 2018-04-26 VITALS
WEIGHT: 140.6 LBS | SYSTOLIC BLOOD PRESSURE: 118 MMHG | HEART RATE: 82 BPM | DIASTOLIC BLOOD PRESSURE: 75 MMHG | OXYGEN SATURATION: 100 % | BODY MASS INDEX: 25.72 KG/M2 | RESPIRATION RATE: 18 BRPM | TEMPERATURE: 98.2 F

## 2018-04-26 DIAGNOSIS — R20.2 PARESTHESIA: Primary | ICD-10-CM

## 2018-04-26 PROCEDURE — 99283 EMERGENCY DEPT VISIT LOW MDM: CPT

## 2018-04-26 NOTE — DISCHARGE INSTRUCTIONS
Numbness and Tingling: Care Instructions  Your Care Instructions    Many things can cause numbness or tingling. Swelling may put pressure on a nerve. This could cause you to lose feeling or have a pins-and-needles sensation on part of your body. Nerves may be damaged from trauma, toxins, or diseases, such as diabetes or multiple sclerosis (MS). Sometimes, though, the cause is not clear. If there is no clear reason for your symptoms, and you are not having any other symptoms, your doctor may suggest watching and waiting for a while to see if the numbness or tingling goes away on its own. Your doctor may want you to have blood or nerve tests to find the cause of your symptoms. Follow-up care is a key part of your treatment and safety. Be sure to make and go to all appointments, and call your doctor if you are having problems. It's also a good idea to know your test results and keep a list of the medicines you take. How can you care for yourself at home? · If your doctor prescribes medicine, take it exactly as directed. Call your doctor if you think you are having a problem with your medicine. · If you have any swelling, put ice or a cold pack on the area for 10 to 20 minutes at a time. Put a thin cloth between the ice and your skin. When should you call for help? Call 911 anytime you think you may need emergency care. For example, call if:  ? · You have weakness, numbness, or tingling in both legs. ? · You lose bowel or bladder control. ? · You have symptoms of a stroke. These may include:  ¨ Sudden numbness, tingling, weakness, or loss of movement in your face, arm, or leg, especially on only one side of your body. ¨ Sudden vision changes. ¨ Sudden trouble speaking. ¨ Sudden confusion or trouble understanding simple statements. ¨ Sudden problems with walking or balance. ¨ A sudden, severe headache that is different from past headaches. ? Watch closely for changes in your health, and be sure to contact your doctor if you have any problems, or if:  ? · You do not get better as expected. Where can you learn more? Go to http://lorraine-scooby.info/. Enter A319 in the search box to learn more about \"Numbness and Tingling: Care Instructions. \"  Current as of: October 14, 2016  Content Version: 11.4  © 2806-2244 [employer]. Care instructions adapted under license by [employer] (which disclaims liability or warranty for this information). If you have questions about a medical condition or this instruction, always ask your healthcare professional. [place] [address] any warranty or liability for your use of this information.

## 2018-04-26 NOTE — ED PROVIDER NOTES
HPI Comments: 25year old female hx ADD and childhood asthma presenting for numbness. Pt reports that for the last 2 hours she has had a few brief, transient episodes of a feeling of tingling and \"muscle twitching\" involving the left lower lip. No symptoms at current time. Denies facial asymmetry, aphasia, headache, other weakness/numbness, vision changes. No treatment attempted PTA. No hx oral HSV. No other concerns. PMHx: as above  Social: + tobacco use. Works as a medical assistant    Patient is a 25 y.o. female presenting with numbness. The history is provided by the patient. Numbness   Pertinent negatives include no shortness of breath, no chest pain, no vomiting, no confusion and no headaches. Past Medical History:   Diagnosis Date    ADD (attention deficit disorder)     Asthma     grew out of it per pt       Past Surgical History:   Procedure Laterality Date    HX  SECTION           Family History:   Problem Relation Age of Onset    Psychiatric Disorder Mother        Social History     Social History    Marital status: SINGLE     Spouse name: N/A    Number of children: N/A    Years of education: N/A     Occupational History    Not on file. Social History Main Topics    Smoking status: Current Every Day Smoker    Smokeless tobacco: Never Used    Alcohol use No      Comment: 3 black and milds a day    Drug use: No    Sexual activity: Not Currently     Other Topics Concern    Not on file     Social History Narrative         ALLERGIES: Review of patient's allergies indicates no known allergies. Review of Systems   Constitutional: Negative for fever. HENT: Negative for trouble swallowing. Eyes: Negative for visual disturbance. Respiratory: Negative for cough and shortness of breath. Cardiovascular: Negative for chest pain. Gastrointestinal: Negative for vomiting. Musculoskeletal: Negative for neck stiffness. Skin: Negative for wound.    Neurological: Positive for numbness. Negative for syncope and headaches. Psychiatric/Behavioral: Negative for confusion. All other systems reviewed and are negative. Vitals:    04/26/18 1716   BP: 118/75   Pulse: 82   Resp: 18   Temp: 98.2 °F (36.8 °C)   SpO2: 100%   Weight: 63.8 kg (140 lb 9.6 oz)            Physical Exam   Constitutional: She is oriented to person, place, and time. She appears well-developed and well-nourished. No distress. Smiling, talkative, well-appearing AA female. Speech clear. HENT:   Head: Normocephalic and atraumatic. Right Ear: External ear normal.   Left Ear: External ear normal.   Lips unremarkable to visible inspection   Eyes: Conjunctivae and EOM are normal. Pupils are equal, round, and reactive to light. No scleral icterus. Neck: Normal range of motion. Neck supple. No tracheal deviation present. Cardiovascular: Normal rate, regular rhythm and normal heart sounds. Exam reveals no gallop and no friction rub. No murmur heard. Pulmonary/Chest: Effort normal and breath sounds normal. No stridor. No respiratory distress. She has no wheezes. Abdominal: Soft. She exhibits no distension. Musculoskeletal: Normal range of motion. Neurological: She is alert and oriented to person, place, and time. She has normal strength. She is not disoriented. No cranial nerve deficit (Ii-XII tested and intact) or sensory deficit. Coordination (normal rapid alternating movements) and gait normal.   5/5  strength  No pronator drift   Skin: Skin is warm and dry. Psychiatric: She has a normal mood and affect. Her behavior is normal.   Nursing note and vitals reviewed. MDM  Number of Diagnoses or Management Options  Paresthesia:   Diagnosis management comments: Healthy 25year old female presenting to the ED for a few, brief episodes of a feeling of tingling and \"twitching\" of the left lower lip this afternoon. Unremarkable to visible inspection.   Normal neuro exam.  No findings on exam or risk factors c/f CVA. Discussed possible causes of symptoms with patient, possible HSV prodrome, muscle spasm, etc.  Discussed return precautions in detail.        Amount and/or Complexity of Data Reviewed  Discuss the patient with other providers: yes (Dr. Jennifer Lomas, ED attending)          ED Course       Procedures

## 2018-04-26 NOTE — ED TRIAGE NOTES
TRIAGE NOTE:  Patient arrives with c/o numbness and tingling to bottom left lip. Patient reports going to [facility], Patient reports symptoms started at 1500 and is intermittent.

## 2018-08-16 ENCOUNTER — HOSPITAL ENCOUNTER (EMERGENCY)
Age: 23
Discharge: HOME | End: 2018-08-17
Attending: EMERGENCY MEDICINE
Payer: MEDICAID

## 2018-08-16 VITALS
RESPIRATION RATE: 16 BRPM | HEART RATE: 71 BPM | TEMPERATURE: 98.2 F | HEIGHT: 61 IN | WEIGHT: 133 LBS | BODY MASS INDEX: 25.11 KG/M2 | DIASTOLIC BLOOD PRESSURE: 73 MMHG | OXYGEN SATURATION: 100 % | SYSTOLIC BLOOD PRESSURE: 114 MMHG

## 2018-08-16 DIAGNOSIS — R19.7 DIARRHEA, UNSPECIFIED TYPE: Primary | ICD-10-CM

## 2018-08-16 LAB
APPEARANCE UR: ABNORMAL
BASOPHILS # BLD: 0 K/UL (ref 0–0.1)
BASOPHILS NFR BLD: 0 % (ref 0–1)
BILIRUB UR QL: NEGATIVE
COLOR UR: ABNORMAL
DIFFERENTIAL METHOD BLD: ABNORMAL
EOSINOPHIL # BLD: 0.2 K/UL (ref 0–0.4)
EOSINOPHIL NFR BLD: 3 % (ref 0–7)
ERYTHROCYTE [DISTWIDTH] IN BLOOD BY AUTOMATED COUNT: 13.2 % (ref 11.5–14.5)
GLUCOSE UR STRIP.AUTO-MCNC: NEGATIVE MG/DL
HCG UR QL: NEGATIVE
HCT VFR BLD AUTO: 37.9 % (ref 35–47)
HGB BLD-MCNC: 12.2 G/DL (ref 11.5–16)
HGB UR QL STRIP: NEGATIVE
IMM GRANULOCYTES # BLD: 0 K/UL (ref 0–0.04)
IMM GRANULOCYTES NFR BLD AUTO: 0 % (ref 0–0.5)
KETONES UR QL STRIP.AUTO: NEGATIVE MG/DL
LEUKOCYTE ESTERASE UR QL STRIP.AUTO: NEGATIVE
LYMPHOCYTES # BLD: 2.9 K/UL (ref 0.8–3.5)
LYMPHOCYTES NFR BLD: 55 % (ref 12–49)
MCH RBC QN AUTO: 28.8 PG (ref 26–34)
MCHC RBC AUTO-ENTMCNC: 32.2 G/DL (ref 30–36.5)
MCV RBC AUTO: 89.4 FL (ref 80–99)
MONOCYTES # BLD: 0.7 K/UL (ref 0–1)
MONOCYTES NFR BLD: 13 % (ref 5–13)
NEUTS SEG # BLD: 1.5 K/UL (ref 1.8–8)
NEUTS SEG NFR BLD: 28 % (ref 32–75)
NITRITE UR QL STRIP.AUTO: NEGATIVE
NRBC # BLD: 0 K/UL (ref 0–0.01)
NRBC BLD-RTO: 0 PER 100 WBC
PH UR STRIP: 6 [PH] (ref 5–8)
PLATELET # BLD AUTO: 213 K/UL (ref 150–400)
PMV BLD AUTO: 11.7 FL (ref 8.9–12.9)
PROT UR STRIP-MCNC: NEGATIVE MG/DL
RBC # BLD AUTO: 4.24 M/UL (ref 3.8–5.2)
SP GR UR REFRACTOMETRY: 1.02 (ref 1–1.03)
UROBILINOGEN UR QL STRIP.AUTO: 1 EU/DL (ref 0.2–1)
WBC # BLD AUTO: 5.3 K/UL (ref 3.6–11)

## 2018-08-16 PROCEDURE — 36415 COLL VENOUS BLD VENIPUNCTURE: CPT | Performed by: EMERGENCY MEDICINE

## 2018-08-16 PROCEDURE — 81003 URINALYSIS AUTO W/O SCOPE: CPT | Performed by: EMERGENCY MEDICINE

## 2018-08-16 PROCEDURE — 99283 EMERGENCY DEPT VISIT LOW MDM: CPT

## 2018-08-16 PROCEDURE — 80053 COMPREHEN METABOLIC PANEL: CPT | Performed by: EMERGENCY MEDICINE

## 2018-08-16 PROCEDURE — 83690 ASSAY OF LIPASE: CPT | Performed by: EMERGENCY MEDICINE

## 2018-08-16 PROCEDURE — 85025 COMPLETE CBC W/AUTO DIFF WBC: CPT | Performed by: EMERGENCY MEDICINE

## 2018-08-16 PROCEDURE — 81025 URINE PREGNANCY TEST: CPT

## 2018-08-16 NOTE — LETTER
[facility] EMERGENCY DEPT 
[address] [facility] [address] [id number] [phone] Work/School Note Date: 8/16/2018 To Whom It May concern: 
 
Jacki Churchill was seen and treated today in the emergency room by the following provider(s): 
Attending Provider: Beth Cobb MD. Jacki Churchill may return to work on 8/19/18. Sincerely, Gianna Law RN

## 2018-08-17 LAB
ALBUMIN SERPL-MCNC: 3.9 G/DL (ref 3.5–5)
ALBUMIN/GLOB SERPL: 1 {RATIO} (ref 1.1–2.2)
ALP SERPL-CCNC: 66 U/L (ref 45–117)
ALT SERPL-CCNC: 14 U/L (ref 12–78)
ANION GAP SERPL CALC-SCNC: 9 MMOL/L (ref 5–15)
AST SERPL-CCNC: 15 U/L (ref 15–37)
BILIRUB SERPL-MCNC: 0.3 MG/DL (ref 0.2–1)
BUN SERPL-MCNC: 11 MG/DL (ref 6–20)
BUN/CREAT SERPL: 14 (ref 12–20)
CALCIUM SERPL-MCNC: 8.7 MG/DL (ref 8.5–10.1)
CHLORIDE SERPL-SCNC: 102 MMOL/L (ref 97–108)
CO2 SERPL-SCNC: 28 MMOL/L (ref 21–32)
CREAT SERPL-MCNC: 0.76 MG/DL (ref 0.55–1.02)
GLOBULIN SER CALC-MCNC: 4 G/DL (ref 2–4)
GLUCOSE SERPL-MCNC: 80 MG/DL (ref 65–100)
LIPASE SERPL-CCNC: 168 U/L (ref 73–393)
POTASSIUM SERPL-SCNC: 3.2 MMOL/L (ref 3.5–5.1)
PROT SERPL-MCNC: 7.9 G/DL (ref 6.4–8.2)
SODIUM SERPL-SCNC: 139 MMOL/L (ref 136–145)

## 2018-08-17 NOTE — ED PROVIDER NOTES
EMERGENCY DEPARTMENT HISTORY AND PHYSICAL EXAM      Date: 8/16/2018  Patient Name: Clyde Bradshaw    History of Presenting Illness     Chief Complaint   Patient presents with    Diarrhea     x 2 days, reports blood in her stool today, denies n/v       History Provided By: Patient    HPI: Clyde Bradshaw, 21 y.o. female with PMHx significant for asthma, presents ambulatory to the ED with cc of acute, constant, moderate diarrhea for the past 2 days. Pt reports 1 episode of a small amount of blood mixed with her stool today. She states her diarrhea has no modifying factors. She states she has taken no meds today. She denies any Hx of IBS, or Crohn's disease. She states she has had no recent abd surgeries. She specifically denies abd pain, nausea, vomiting, dizziness, melena, dysuria, hematuria, back pain, fevers, chills. Chief Complaint: diarrhea  Duration: 2 Days  Timing:  Acute and Constant  Location: N/A  Quality: N/A  Severity: Moderate  Modifying Factors: None  Associated Symptoms: bloody stool    There are no other complaints, changes, or physical findings at this time. PCP: Panchito Noriega MD    Current Outpatient Prescriptions   Medication Sig Dispense Refill    acetaminophen (TYLENOL) 325 mg tablet Take 2 Tabs by mouth every four (4) hours as needed for Pain. 20 Tab 0    promethazine (PHENERGAN) 25 mg tablet Take 1 Tab by mouth every six (6) hours as needed for Nausea. 12 Tab 0    promethazine (PHENERGAN) 25 mg tablet Take 1 Tab by mouth every six (6) hours as needed for Nausea. 12 Tab 0    amoxicillin 500 mg tab Take 500 mg by mouth three (3) times daily. 30 Tab 0    albuterol (PROVENTIL VENTOLIN) 2.5 mg /3 mL (0.083 %) nebulizer solution Take 3 mL by inhalation every four (4) hours as needed for Wheezing. 25 Each 2    albuterol (PROVENTIL HFA, VENTOLIN HFA) 90 mcg/actuation inhaler Take 2 Puffs by inhalation daily as needed.            Past History     Past Medical History:  Past Medical History: Diagnosis Date    ADD (attention deficit disorder)     Asthma     grew out of it per pt       Past Surgical History:  Past Surgical History:   Procedure Laterality Date    HX  SECTION         Family History:  Family History   Problem Relation Age of Onset    Psychiatric Disorder Mother        Social History:  Social History   Substance Use Topics    Smoking status: Current Every Day Smoker    Smokeless tobacco: Never Used    Alcohol use No      Comment: 3 black and milds a day       Allergies:  No Known Allergies      Review of Systems   Review of Systems   Constitutional: Negative for chills and fever. HENT: Negative for congestion, rhinorrhea, sneezing and sore throat. Respiratory: Negative for shortness of breath. Cardiovascular: Negative for chest pain. Gastrointestinal: Positive for blood in stool and diarrhea. Negative for abdominal pain, nausea and vomiting. Denies melena    Genitourinary: Negative for dysuria and hematuria. Musculoskeletal: Negative for back pain, myalgias and neck stiffness. Skin: Negative for rash. Neurological: Negative for dizziness, weakness and headaches. All other systems reviewed and are negative. Physical Exam   Physical Exam   Constitutional: She is oriented to person, place, and time. She appears well-developed and well-nourished. HENT:   Head: Normocephalic. Mouth/Throat: Oropharynx is clear and moist.   Eyes: Conjunctivae and EOM are normal. Pupils are equal, round, and reactive to light. Neck: Normal range of motion. Neck supple. Cardiovascular: Normal rate, regular rhythm, normal heart sounds and intact distal pulses. Pulmonary/Chest: Effort normal and breath sounds normal.   Abdominal: Soft. Bowel sounds are normal. She exhibits no distension. There is no rebound. Musculoskeletal: Normal range of motion. She exhibits no edema or deformity. Neurological: She is alert and oriented to person, place, and time.    Skin: Skin is warm and dry. Psychiatric: She has a normal mood and affect. Her behavior is normal. Judgment and thought content normal.   Nursing note and vitals reviewed. Diagnostic Study Results     Labs -     Recent Results (from the past 12 hour(s))   CBC WITH AUTOMATED DIFF    Collection Time: 08/16/18 11:21 PM   Result Value Ref Range    WBC 5.3 3.6 - 11.0 K/uL    RBC 4.24 3.80 - 5.20 M/uL    HGB 12.2 11.5 - 16.0 g/dL    HCT 37.9 35.0 - 47.0 %    MCV 89.4 80.0 - 99.0 FL    MCH 28.8 26.0 - 34.0 PG    MCHC 32.2 30.0 - 36.5 g/dL    RDW 13.2 11.5 - 14.5 %    PLATELET 146 994 - 836 K/uL    MPV 11.7 8.9 - 12.9 FL    NRBC 0.0 0  WBC    ABSOLUTE NRBC 0.00 0.00 - 0.01 K/uL    NEUTROPHILS 28 (L) 32 - 75 %    LYMPHOCYTES 55 (H) 12 - 49 %    MONOCYTES 13 5 - 13 %    EOSINOPHILS 3 0 - 7 %    BASOPHILS 0 0 - 1 %    IMMATURE GRANULOCYTES 0 0.0 - 0.5 %    ABS. NEUTROPHILS 1.5 (L) 1.8 - 8.0 K/UL    ABS. LYMPHOCYTES 2.9 0.8 - 3.5 K/UL    ABS. MONOCYTES 0.7 0.0 - 1.0 K/UL    ABS. EOSINOPHILS 0.2 0.0 - 0.4 K/UL    ABS. BASOPHILS 0.0 0.0 - 0.1 K/UL    ABS. IMM. GRANS. 0.0 0.00 - 0.04 K/UL    DF AUTOMATED     METABOLIC PANEL, COMPREHENSIVE    Collection Time: 08/16/18 11:21 PM   Result Value Ref Range    Sodium 139 136 - 145 mmol/L    Potassium 3.2 (L) 3.5 - 5.1 mmol/L    Chloride 102 97 - 108 mmol/L    CO2 28 21 - 32 mmol/L    Anion gap 9 5 - 15 mmol/L    Glucose 80 65 - 100 mg/dL    BUN 11 6 - 20 MG/DL    Creatinine 0.76 0.55 - 1.02 MG/DL    BUN/Creatinine ratio 14 12 - 20      GFR est AA >60 >60 ml/min/1.73m2    GFR est non-AA >60 >60 ml/min/1.73m2    Calcium 8.7 8.5 - 10.1 MG/DL    Bilirubin, total 0.3 0.2 - 1.0 MG/DL    ALT (SGPT) 14 12 - 78 U/L    AST (SGOT) 15 15 - 37 U/L    Alk.  phosphatase 66 45 - 117 U/L    Protein, total 7.9 6.4 - 8.2 g/dL    Albumin 3.9 3.5 - 5.0 g/dL    Globulin 4.0 2.0 - 4.0 g/dL    A-G Ratio 1.0 (L) 1.1 - 2.2     LIPASE    Collection Time: 08/16/18 11:21 PM   Result Value Ref Range    Lipase 168 73 - 393 U/L   URINALYSIS W/ RFLX MICROSCOPIC    Collection Time: 08/16/18 11:21 PM   Result Value Ref Range    Color YELLOW/STRAW      Appearance CLOUDY (A) CLEAR      Specific gravity 1.025 1.003 - 1.030      pH (UA) 6.0 5.0 - 8.0      Protein NEGATIVE  NEG mg/dL    Glucose NEGATIVE  NEG mg/dL    Ketone NEGATIVE  NEG mg/dL    Bilirubin NEGATIVE  NEG      Blood NEGATIVE  NEG      Urobilinogen 1.0 0.2 - 1.0 EU/dL    Nitrites NEGATIVE  NEG      Leukocyte Esterase NEGATIVE  NEG     HCG URINE, QL. - POC    Collection Time: 08/16/18 11:24 PM   Result Value Ref Range    Pregnancy test,urine (POC) NEGATIVE  NEG         Medical Decision Making   I am the first provider for this patient. I reviewed the vital signs, available nursing notes, past medical history, past surgical history, family history and social history. Vital Signs-Reviewed the patient's vital signs. Patient Vitals for the past 12 hrs:   Temp Pulse Resp BP SpO2   08/16/18 2250 98.2 °F (36.8 °C) 71 16 114/73 100 %       Pulse Oximetry Analysis - 100% on RA    Cardiac Monitor:   Rate: 87 bpm  Rhythm: Normal Sinus Rhythm      Records Reviewed: Nursing Notes and Old Medical Records    Provider Notes (Medical Decision Making):   DDx: enteritis, food poisoning     ED Course:   Initial assessment performed. The patients presenting problems have been discussed, and they are in agreement with the care plan formulated and outlined with them. I have encouraged them to ask questions as they arise throughout their visit. Disposition:  Discharge Note:  12:14 AM  The pt is ready for discharge. The pt's signs, symptoms, diagnosis, and discharge instructions have been discussed and pt has conveyed their understanding. The pt is to follow up as recommended or return to ER should their symptoms worsen. Plan has been discussed and pt is in agreement. PLAN:  1. Current Discharge Medication List        2.    Follow-up Information     Follow up With Details Comments Contact Info    Arabella Alves MD Call  [address] [facility] [id number] [phone]      [facility] EMERGENCY DEPT  As needed, If symptoms worsen [address]  [phone]        Return to ED if worse     Diagnosis     Clinical Impression:   1. Diarrhea, unspecified type        Attestations: This note is prepared by Alfred Hayes. Hernan Fowler, acting as Scribe for Yovana Roa MD.    Yovana Roa MD: The scribe's documentation has been prepared under my direction and personally reviewed by me in its entirety. I confirm that the note above accurately reflects all work, treatment, procedures, and medical decision making performed by me. This note will not be viewable in [address]

## 2018-08-17 NOTE — ED NOTES
Discharge instructions were given to the patient by provider. The patient left the Emergency Department ambulatory, alert and oriented and in no acute distress with 0 prescriptions. The patient was encouraged to call or return to the ED for worsening issues or problems and was encouraged to schedule a follow up appointment for continuing care. The patient verbalized understanding of discharge instructions and prescriptions, all questions were answered. The patient has no further concerns at this time.

## 2018-08-17 NOTE — DISCHARGE INSTRUCTIONS
Diarrhea: Care Instructions  Your Care Instructions    Diarrhea is loose, watery stools (bowel movements). The exact cause is often hard to find. Sometimes diarrhea is your body's way of getting rid of what caused an upset stomach. Viruses, food poisoning, and many medicines can cause diarrhea. Some people get diarrhea in response to emotional stress, anxiety, or certain foods. Almost everyone has diarrhea now and then. It usually isn't serious, and your stools will return to normal soon. The important thing to do is replace the fluids you have lost, so you can prevent dehydration. The doctor has checked you carefully, but problems can develop later. If you notice any problems or new symptoms, get medical treatment right away. Follow-up care is a key part of your treatment and safety. Be sure to make and go to all appointments, and call your doctor if you are having problems. It's also a good idea to know your test results and keep a list of the medicines you take. How can you care for yourself at home? · Watch for signs of dehydration, which means your body has lost too much water. Dehydration is a serious condition and should be treated right away. Signs of dehydration are:  ¨ Increasing thirst and dry eyes and mouth. ¨ Feeling faint or lightheaded. ¨ Darker urine, and a smaller amount of urine than normal.  · To prevent dehydration, drink plenty of fluids, enough so that your urine is light yellow or clear like water. Choose water and other caffeine-free clear liquids until you feel better. If you have kidney, heart, or liver disease and have to limit fluids, talk with your doctor before you increase the amount of fluids you drink. · Begin eating small amounts of mild foods the next day, if you feel like it. ¨ Try yogurt that has live cultures of Lactobacillus. (Check the label.)  ¨ Avoid spicy foods, fruits, alcohol, and caffeine until 48 hours after all symptoms are gone.   ¨ Avoid chewing gum that contains sorbitol. ¨ Avoid dairy products (except for yogurt with Lactobacillus) while you have diarrhea and for 3 days after symptoms are gone. · The doctor may recommend that you take over-the-counter medicine, such as loperamide (Imodium), if you still have diarrhea after 6 hours. Read and follow all instructions on the label. Do not use this medicine if you have bloody diarrhea, a high fever, or other signs of serious illness. Call your doctor if you think you are having a problem with your medicine. When should you call for help? Call 911 anytime you think you may need emergency care. For example, call if:    · You passed out (lost consciousness).     · Your stools are maroon or very bloody.    Call your doctor now or seek immediate medical care if:    · You are dizzy or lightheaded, or you feel like you may faint.     · Your stools are black and look like tar, or they have streaks of blood.     · You have new or worse belly pain.     · You have symptoms of dehydration, such as:  ¨ Dry eyes and a dry mouth. ¨ Passing only a little dark urine. ¨ Feeling thirstier than usual.     · You have a new or higher fever.    Watch closely for changes in your health, and be sure to contact your doctor if:    · Your diarrhea is getting worse.     · You see pus in the diarrhea.     · You are not getting better after 2 days (48 hours). Where can you learn more? Go to http://lorraine-scooby.info/. Enter A140 in the search box to learn more about \"Diarrhea: Care Instructions. \"  Current as of: November 20, 2017  Content Version: 11.7  © 8243-3773 [employer]. Care instructions adapted under license by [employer] (which disclaims liability or warranty for this information). If you have questions about a medical condition or this instruction, always ask your healthcare professional. [address] any warranty or liability for your use of this information.

## 2018-08-17 NOTE — ED NOTES
Pt presents to ED ambulatory complaining of diarrhea x 2 days. Pt reporting scant amount of blood in stool. Pt denies urinary dysfunction, abd pain, or N/V. Pt is alert and oriented x 4, RR even and unlabored, skin is warm and dry. Assessment completed and pt updated on plan of care. Emergency Department Nursing Plan of Care       The Nursing Plan of Care is developed from the Nursing assessment and Emergency Department Attending provider initial evaluation. The plan of care may be reviewed in the ED Provider note.     The Plan of Care was developed with the following considerations:   Patient / Family readiness to learn indicated by:verbalized understanding  Persons(s) to be included in education: patient  Barriers to Learning/Limitations:No    Signed     Marcelle Hidden    8/16/2018   11:01 PM

## 2019-06-13 ENCOUNTER — APPOINTMENT (OUTPATIENT)
Dept: CT IMAGING | Age: 24
End: 2019-06-13
Attending: PHYSICIAN ASSISTANT
Payer: MEDICAID

## 2019-06-13 ENCOUNTER — HOSPITAL ENCOUNTER (EMERGENCY)
Age: 24
Discharge: HOME | End: 2019-06-13
Attending: EMERGENCY MEDICINE
Payer: MEDICAID

## 2019-06-13 VITALS
HEIGHT: 62 IN | OXYGEN SATURATION: 99 % | HEART RATE: 78 BPM | WEIGHT: 127 LBS | DIASTOLIC BLOOD PRESSURE: 83 MMHG | BODY MASS INDEX: 23.37 KG/M2 | RESPIRATION RATE: 16 BRPM | SYSTOLIC BLOOD PRESSURE: 120 MMHG | TEMPERATURE: 97.6 F

## 2019-06-13 DIAGNOSIS — N30.00 ACUTE CYSTITIS WITHOUT HEMATURIA: Primary | ICD-10-CM

## 2019-06-13 LAB
APPEARANCE UR: ABNORMAL
BACTERIA URNS QL MICRO: NEGATIVE /HPF
BILIRUB UR QL CFM: NEGATIVE
COLOR UR: ABNORMAL
EPITH CASTS URNS QL MICRO: ABNORMAL /LPF
GLUCOSE UR STRIP.AUTO-MCNC: NEGATIVE MG/DL
HCG UR QL: NEGATIVE
HGB UR QL STRIP: NEGATIVE
KETONES UR QL STRIP.AUTO: ABNORMAL MG/DL
LEUKOCYTE ESTERASE UR QL STRIP.AUTO: NEGATIVE
NITRITE UR QL STRIP.AUTO: NEGATIVE
PH UR STRIP: 6 [PH] (ref 5–8)
PROT UR STRIP-MCNC: ABNORMAL MG/DL
RBC #/AREA URNS HPF: ABNORMAL /HPF (ref 0–5)
SP GR UR REFRACTOMETRY: 1.03 (ref 1–1.03)
UA: UC IF INDICATED,UAUC: ABNORMAL
UROBILINOGEN UR QL STRIP.AUTO: 1 EU/DL (ref 0.2–1)
WBC URNS QL MICRO: ABNORMAL /HPF (ref 0–4)

## 2019-06-13 PROCEDURE — 81001 URINALYSIS AUTO W/SCOPE: CPT

## 2019-06-13 PROCEDURE — 81025 URINE PREGNANCY TEST: CPT

## 2019-06-13 PROCEDURE — 87086 URINE CULTURE/COLONY COUNT: CPT

## 2019-06-13 PROCEDURE — 74176 CT ABD & PELVIS W/O CONTRAST: CPT

## 2019-06-13 PROCEDURE — 99283 EMERGENCY DEPT VISIT LOW MDM: CPT

## 2019-06-13 RX ORDER — CEPHALEXIN 500 MG/1
500 CAPSULE ORAL 2 TIMES DAILY
Qty: 14 CAP | Refills: 0 | Status: SHIPPED | OUTPATIENT
Start: 2019-06-13 | End: 2019-06-13

## 2019-06-13 RX ORDER — NAPROXEN 500 MG/1
500 TABLET ORAL 2 TIMES DAILY WITH MEALS
Qty: 20 TAB | Refills: 0 | Status: SHIPPED | OUTPATIENT
Start: 2019-06-13 | End: 2021-04-01

## 2019-06-13 RX ORDER — CEPHALEXIN 500 MG/1
500 CAPSULE ORAL 2 TIMES DAILY
Qty: 14 CAP | Refills: 0 | Status: SHIPPED | OUTPATIENT
Start: 2019-06-13 | End: 2019-06-20

## 2019-06-13 RX ORDER — NAPROXEN 500 MG/1
500 TABLET ORAL 2 TIMES DAILY WITH MEALS
Qty: 20 TAB | Refills: 0 | Status: SHIPPED | OUTPATIENT
Start: 2019-06-13 | End: 2019-06-13

## 2019-06-13 NOTE — ED NOTES
Provider at bedside reviewing CT scan results. Pt for DC. Pt accepted DC data and med's. Pt left unit steady gait. Patient (s)  given copy of dc instructions and 2 script(s). Patient (s)  verbalized understanding of instructions and script (s). Patient given a current medication reconciliation form and verbalized understanding of their medications. Patient (s) verbalized understanding of the importance of discussing medications with  his or her physician or clinic they will be following up with. Patient alert and oriented and in no acute distress. Patient discharged home ambulatory with self.

## 2019-06-13 NOTE — DISCHARGE INSTRUCTIONS

## 2019-06-13 NOTE — ED TRIAGE NOTES
C/o right flank pain with dark urine x 4 days or so; not alleviated with bowel movement or passing gas

## 2019-06-13 NOTE — ED NOTES
Emergency Department Nursing Plan of Care       The Nursing Plan of Care is developed from the Nursing assessment and Emergency Department Attending provider initial evaluation. The plan of care may be reviewed in the ED Provider note.     The Plan of Care was developed with the following considerations:   Patient / Family readiness to learn indicated by:verbalized understanding and successful return demonstration  Persons(s) to be included in education: patient  Barriers to Learning/Limitations:No    Signed     Jonathan Teran RN    6/13/2019   11:37 AM

## 2019-06-13 NOTE — ED PROVIDER NOTES
EMERGENCY DEPARTMENT HISTORY AND PHYSICAL EXAM      Date: 2019  Patient Name: Emma Steiner    History of Presenting Illness     Chief Complaint   Patient presents with    Flank Pain       History Provided By: Patient    HPI: Emma Steiner, 21 y.o. female with PMHx significant for ADD, asthma presents ambulatory to the ED with cc of intermittent aching right flank pain x 4 days with assoc \"dark urine\". Denies dysuria, hematuria, change in vaginal discharge. Denies hx kidney stones. Pt has not taken anything for sx. LMP: 2 weeks prior. Denies N/V, diarrhea, constipation. Pt reports last BM - this am, normal for pt. Previous abdominal surgeries include:  section. There are no other complaints, changes, or physical findings at this time. PCP: Ava Gregory MD    No current facility-administered medications on file prior to encounter. Current Outpatient Medications on File Prior to Encounter   Medication Sig Dispense Refill    acetaminophen (TYLENOL) 325 mg tablet Take 2 Tabs by mouth every four (4) hours as needed for Pain. 20 Tab 0    promethazine (PHENERGAN) 25 mg tablet Take 1 Tab by mouth every six (6) hours as needed for Nausea. 12 Tab 0    promethazine (PHENERGAN) 25 mg tablet Take 1 Tab by mouth every six (6) hours as needed for Nausea. 12 Tab 0    amoxicillin 500 mg tab Take 500 mg by mouth three (3) times daily. 30 Tab 0    albuterol (PROVENTIL VENTOLIN) 2.5 mg /3 mL (0.083 %) nebulizer solution Take 3 mL by inhalation every four (4) hours as needed for Wheezing. 25 Each 2    albuterol (PROVENTIL HFA, VENTOLIN HFA) 90 mcg/actuation inhaler Take 2 Puffs by inhalation daily as needed.            Past History     Past Medical History:  Past Medical History:   Diagnosis Date    ADD (attention deficit disorder)     Asthma     grew out of it per pt       Past Surgical History:  Past Surgical History:   Procedure Laterality Date    HX  SECTION         Family History:  Family History   Problem Relation Age of Onset    Psychiatric Disorder Mother        Social History:  Social History     Tobacco Use    Smoking status: Current Every Day Smoker    Smokeless tobacco: Never Used   Substance Use Topics    Alcohol use: No     Comment: 3 black and milds a day    Drug use: No       Allergies:  No Known Allergies      Review of Systems   Review of Systems   Constitutional: Negative for chills and fever. Respiratory: Negative for shortness of breath. Cardiovascular: Negative for chest pain. Gastrointestinal: Negative for abdominal pain, nausea and vomiting. Genitourinary: Positive for flank pain. Negative for dysuria, frequency, genital sores and vaginal discharge. Belita Hattie urine\"   Musculoskeletal: Negative for back pain and myalgias. Skin: Negative for color change, pallor, rash and wound. Neurological: Negative for dizziness, weakness and light-headedness. All other systems reviewed and are negative. Physical Exam   Physical Exam   Constitutional: She is oriented to person, place, and time. She appears well-developed and well-nourished. No distress. HENT:   Head: Normocephalic and atraumatic. Eyes: Conjunctivae are normal.   Cardiovascular: Normal rate, regular rhythm and normal heart sounds. Pulmonary/Chest: Effort normal and breath sounds normal. No respiratory distress. Abdominal: Soft. Bowel sounds are normal. She exhibits no distension. Abdomen soft, nontender  Pain not reproducible on exam  CVA tenderness   Musculoskeletal: Normal range of motion. Neurological: She is alert and oriented to person, place, and time. Skin: Skin is warm. No rash noted. Psychiatric: She has a normal mood and affect. Her behavior is normal.   Nursing note and vitals reviewed.       Diagnostic Study Results     Labs -     Recent Results (from the past 12 hour(s))   URINALYSIS W/ REFLEX CULTURE    Collection Time: 06/13/19 10:37 AM   Result Value Ref Range    Color DARK YELLOW      Appearance CLOUDY (A) CLEAR      Specific gravity 1.030 1.003 - 1.030      pH (UA) 6.0 5.0 - 8.0      Protein TRACE (A) NEG mg/dL    Glucose NEGATIVE  NEG mg/dL    Ketone TRACE (A) NEG mg/dL    Blood NEGATIVE  NEG      Urobilinogen 1.0 0.2 - 1.0 EU/dL    Nitrites NEGATIVE  NEG      Leukocyte Esterase NEGATIVE  NEG      WBC 5-10 0 - 4 /hpf    RBC 0-5 0 - 5 /hpf    Epithelial cells MODERATE (A) FEW /lpf    Bacteria NEGATIVE  NEG /hpf    UA:UC IF INDICATED URINE CULTURE ORDERED (A) CNI     BILIRUBIN, CONFIRM    Collection Time: 06/13/19 10:37 AM   Result Value Ref Range    Bilirubin UA, confirm NEGATIVE  NEG     HCG URINE, QL. - POC    Collection Time: 06/13/19 10:38 AM   Result Value Ref Range    Pregnancy test,urine (POC) NEGATIVE  NEG         Radiologic Studies -   CT ABD PELV WO CONT   Final Result   IMPRESSION:   No renal or ureteral calculus identified    Incidental left ovarian cyst        CT Results  (Last 48 hours)               06/13/19 1248  CT ABD PELV WO CONT Final result    Impression:  IMPRESSION:   No renal or ureteral calculus identified    Incidental left ovarian cyst       Narrative:  EXAM: CT ABD PELV WO CONT       INDICATION: Flank pain, stone disease suspected; right flank pain       COMPARISON: None       CONTRAST:  None. TECHNIQUE:    Thin axial images were obtained through the abdomen and pelvis. Coronal and   sagittal reconstructions were generated. Oral contrast was not administered. CT   dose reduction was achieved through use of a standardized protocol tailored for   this examination and automatic exposure control for dose modulation. The absence of intravenous contrast material reduces the sensitivity for   evaluation of the solid parenchymal organs of the abdomen. FINDINGS:    LUNG BASES: Clear. INCIDENTALLY IMAGED HEART AND MEDIASTINUM: Unremarkable. LIVER: No mass or biliary dilatation. GALLBLADDER: Unremarkable. SPLEEN: No mass.    PANCREAS: No mass or ductal dilatation. ADRENALS: Unremarkable. KIDNEYS/URETERS: No mass, calculus, or hydronephrosis. STOMACH: Unremarkable. SMALL BOWEL: No dilatation or wall thickening. COLON: No dilatation or wall thickening. APPENDIX: Not visualized   PERITONEUM: No ascites or pneumoperitoneum. RETROPERITONEUM: No lymphadenopathy or aortic aneurysm. REPRODUCTIVE ORGANS: Anteverted uterus with IUD. Left ovarian 2.7 cm cyst with   Hounsfield unit measurement of 10 (image 63). URINARY BLADDER: No mass or calculus. BONES: No destructive bone lesion. ADDITIONAL COMMENTS: N/A               CXR Results  (Last 48 hours)    None            Medical Decision Making   I am the first provider for this patient. I reviewed the vital signs, available nursing notes, past medical history, past surgical history, family history and social history. Vital Signs-Reviewed the patient's vital signs. Patient Vitals for the past 12 hrs:   Temp Pulse Resp BP SpO2   06/13/19 1020 97.6 °F (36.4 °C) 78 16 120/83 99 %         Records Reviewed: Nursing Notes and Old Medical Records    Provider Notes (Medical Decision Making):   DDx: UTI, Pyelo, Kidney calculus     ED Course:   Initial assessment performed. The patients presenting problems have been discussed, and they are in agreement with the care plan formulated and outlined with them. I have encouraged them to ask questions as they arise throughout their visit. Disposition:  1:35 PM  Discussed lab and imaging results with pt along with dx and treatment plan. Discussed importance of PCP follow up. All questions answered. Pt voiced they understood. Return if sx worsen. PLAN:  1. Current Discharge Medication List      START taking these medications    Details   cephALEXin (KEFLEX) 500 mg capsule Take 1 Cap by mouth two (2) times a day for 7 days.   Qty: 14 Cap, Refills: 0      naproxen (NAPROSYN) 500 mg tablet Take 1 Tab by mouth two (2) times daily (with meals). Qty: 20 Tab, Refills: 0           2. Follow-up Information     Follow up With Specialties Details Why Contact Info    Amee Saxena MD Family Practice Schedule an appointment as soon as possible for a visit As needed [address] [facility]  [phone]          Return to ED if worse     Diagnosis     Clinical Impression:   1.  Acute cystitis without hematuria

## 2019-06-14 LAB
BACTERIA SPEC CULT: NORMAL
CC UR VC: NORMAL
SERVICE CMNT-IMP: NORMAL

## 2019-07-26 ENCOUNTER — HOSPITAL ENCOUNTER (EMERGENCY)
Age: 24
Discharge: HOME | End: 2019-07-26
Attending: EMERGENCY MEDICINE
Payer: MEDICAID

## 2019-07-26 VITALS
HEIGHT: 62 IN | BODY MASS INDEX: 24.16 KG/M2 | RESPIRATION RATE: 18 BRPM | HEART RATE: 82 BPM | OXYGEN SATURATION: 96 % | SYSTOLIC BLOOD PRESSURE: 110 MMHG | TEMPERATURE: 97.7 F | WEIGHT: 131.31 LBS | DIASTOLIC BLOOD PRESSURE: 62 MMHG

## 2019-07-26 DIAGNOSIS — J45.21 MILD INTERMITTENT ASTHMA WITH ACUTE EXACERBATION: Primary | ICD-10-CM

## 2019-07-26 PROCEDURE — 77030029684 HC NEB SM VOL KT MONA -A

## 2019-07-26 PROCEDURE — 74011000250 HC RX REV CODE- 250: Performed by: EMERGENCY MEDICINE

## 2019-07-26 PROCEDURE — 94640 AIRWAY INHALATION TREATMENT: CPT

## 2019-07-26 PROCEDURE — 99283 EMERGENCY DEPT VISIT LOW MDM: CPT

## 2019-07-26 PROCEDURE — 74011636637 HC RX REV CODE- 636/637: Performed by: EMERGENCY MEDICINE

## 2019-07-26 RX ORDER — ALBUTEROL SULFATE 90 UG/1
2 AEROSOL, METERED RESPIRATORY (INHALATION)
Qty: 1 INHALER | Refills: 1 | Status: SHIPPED | OUTPATIENT
Start: 2019-07-26 | End: 2021-04-01

## 2019-07-26 RX ORDER — PREDNISONE 20 MG/1
60 TABLET ORAL DAILY
Qty: 15 TAB | Refills: 0 | Status: SHIPPED | OUTPATIENT
Start: 2019-07-26 | End: 2019-07-31

## 2019-07-26 RX ORDER — IPRATROPIUM BROMIDE AND ALBUTEROL SULFATE 2.5; .5 MG/3ML; MG/3ML
3 SOLUTION RESPIRATORY (INHALATION)
Status: COMPLETED | OUTPATIENT
Start: 2019-07-26 | End: 2019-07-26

## 2019-07-26 RX ADMIN — PREDNISONE 60 MG: 50 TABLET ORAL at 14:17

## 2019-07-26 RX ADMIN — IPRATROPIUM BROMIDE AND ALBUTEROL SULFATE 3 ML: .5; 3 SOLUTION RESPIRATORY (INHALATION) at 13:57

## 2019-07-26 NOTE — ED NOTES
Pt for DC home and accepted DC data and med. Pt left unit steady gait. Patient (s)  given copy of dc instructions and 2 script(s). Patient (s)  verbalized understanding of instructions and script (s). Patient given a current medication reconciliation form and verbalized understanding of their medications. Patient (s) verbalized understanding of the importance of discussing medications with  his or her physician or clinic they will be following up with. Patient alert and oriented and in no acute distress. Patient discharged home ambulatory with self.

## 2019-07-26 NOTE — DISCHARGE INSTRUCTIONS

## 2019-07-26 NOTE — ED TRIAGE NOTES
Patient comes to the ED for treatment of asthma and med refill. Stated \"breathing\" difficulty while ambulating.   No wheezing appreciated

## 2019-07-26 NOTE — ED PROVIDER NOTES
EMERGENCY DEPARTMENT HISTORY AND PHYSICAL EXAM      Date: 7/26/2019  Patient Name: Jessica Haro    History of Presenting Illness     Chief Complaint   Patient presents with    Wheezing       History Provided By: Patient    HPI: Jessica Haro, 25 y.o. female with PMHx significant for asthma, presents ambulatory to the ED with cc of acute on chronic SOB, wheezing, and chest congestion x 3 days. Pt reports that she ran out of her albuterol inhalers ~ 3 days ago which is when her sx's started. She notes that she smokes tobacco daily. Pt denies any alleviating/exacerbating factors for her current sx's. She specifically denies any fever, chills, CP, HA, N/V/D, abdominal pain, or rash. There are no other complaints, changes, or physical findings at this time. Social Hx: - EtOH; + Smoker (daily); - Illicit Drugs     PCP: Myrtle Frederick MD    Current Outpatient Medications   Medication Sig Dispense Refill    albuterol (PROVENTIL HFA, VENTOLIN HFA, PROAIR HFA) 90 mcg/actuation inhaler Take 2 Puffs by inhalation every four (4) hours as needed for Wheezing. 1 Inhaler 1    predniSONE (DELTASONE) 20 mg tablet Take 60 mg by mouth daily for 5 days. 15 Tab 0    naproxen (NAPROSYN) 500 mg tablet Take 1 Tab by mouth two (2) times daily (with meals). 20 Tab 0    acetaminophen (TYLENOL) 325 mg tablet Take 2 Tabs by mouth every four (4) hours as needed for Pain. 20 Tab 0    promethazine (PHENERGAN) 25 mg tablet Take 1 Tab by mouth every six (6) hours as needed for Nausea. 12 Tab 0    promethazine (PHENERGAN) 25 mg tablet Take 1 Tab by mouth every six (6) hours as needed for Nausea. 12 Tab 0    amoxicillin 500 mg tab Take 500 mg by mouth three (3) times daily. 30 Tab 0    albuterol (PROVENTIL VENTOLIN) 2.5 mg /3 mL (0.083 %) nebulizer solution Take 3 mL by inhalation every four (4) hours as needed for Wheezing.  25 Each 2    albuterol (PROVENTIL HFA, VENTOLIN HFA) 90 mcg/actuation inhaler Take 2 Puffs by inhalation daily as needed. Past History     Past Medical History:  Past Medical History:   Diagnosis Date    ADD (attention deficit disorder)     Asthma     grew out of it per pt       Past Surgical History:  Past Surgical History:   Procedure Laterality Date    HX  SECTION         Family History:  Family History   Problem Relation Age of Onset    Psychiatric Disorder Mother        Social History:  Social History     Tobacco Use    Smoking status: Current Every Day Smoker    Smokeless tobacco: Never Used   Substance Use Topics    Alcohol use: No     Comment: 3 black and milds a day    Drug use: No       Allergies:  No Known Allergies    Review of Systems   Review of Systems   Constitutional: Negative for chills and fever. HENT: Positive for congestion. Negative for sore throat. Eyes: Negative for photophobia and redness. Respiratory: Positive for shortness of breath and wheezing. Cardiovascular: Negative for chest pain and leg swelling. Gastrointestinal: Negative for abdominal pain, blood in stool, nausea and vomiting. Genitourinary: Negative for difficulty urinating, dysuria, hematuria, menstrual problem and vaginal bleeding. Musculoskeletal: Negative for back pain and joint swelling. Neurological: Negative for dizziness, seizures, syncope, speech difficulty, weakness, numbness and headaches. Hematological: Negative for adenopathy. Psychiatric/Behavioral: Negative for agitation, confusion and suicidal ideas. The patient is not nervous/anxious. Physical Exam   Physical Exam   Constitutional: She is oriented to person, place, and time. She appears well-developed and well-nourished. HENT:   Head: Normocephalic and atraumatic. Mouth/Throat: Oropharynx is clear and moist.   Eyes: Conjunctivae and EOM are normal.   Neck: Normal range of motion and full passive range of motion without pain. Neck supple.    Cardiovascular: Normal rate, regular rhythm, S1 normal, S2 normal, normal heart sounds, intact distal pulses and normal pulses. No murmur heard. Pulmonary/Chest: Effort normal. No respiratory distress. She has wheezes (expiratory). She has rhonchi. She exhibits no tenderness. Abdominal: Soft. Normal appearance and bowel sounds are normal. She exhibits no distension. There is no tenderness. There is no rebound. Musculoskeletal: Normal range of motion. Neurological: She is alert and oriented to person, place, and time. She has normal strength. Skin: Skin is warm, dry and intact. No rash noted. Psychiatric: She has a normal mood and affect. Her speech is normal and behavior is normal. Judgment and thought content normal.   Nursing note and vitals reviewed. Medical Decision Making   I am the first provider for this patient. I reviewed the vital signs, available nursing notes, past medical history, past surgical history, family history and social history. Vital Signs-Reviewed the patient's vital signs. Patient Vitals for the past 12 hrs:   Temp Pulse Resp BP SpO2   07/26/19 1357 -- -- -- -- 96 %   07/26/19 1309 97.7 °F (36.5 °C) 82 18 110/62 96 %       Pulse Oximetry Analysis - 96% on RA    Cardiac Monitor:   Rate: 82 bpm  Rhythm: Normal Sinus Rhythm      Records Reviewed: Nursing Notes and Old Medical Records    Provider Notes (Medical Decision Making):   DDx: asthma exacerbation, bronchitis    ED Course:   Initial assessment performed. The patients presenting problems have been discussed, and they are in agreement with the care plan formulated and outlined with them. I have encouraged them to ask questions as they arise throughout their visit. Critical Care Time:   None    Disposition:  Discharge Note:  2:36 PM  The patient has been re-evaluated and is ready for discharge. Reviewed available results with patient. Counseled patient/parent/guardian on diagnosis and care plan. Patient has expressed understanding, and all questions have been answered.  Patient agrees with plan and agrees to follow up as recommended, or return to the ED if their symptoms worsen. Discharge instructions have been provided and explained to the patient, along with reasons to return to the ED. PLAN:  1. Current Discharge Medication List      START taking these medications    Details   !! albuterol (PROVENTIL HFA, VENTOLIN HFA, PROAIR HFA) 90 mcg/actuation inhaler Take 2 Puffs by inhalation every four (4) hours as needed for Wheezing. Qty: 1 Inhaler, Refills: 1      predniSONE (DELTASONE) 20 mg tablet Take 60 mg by mouth daily for 5 days. Qty: 15 Tab, Refills: 0       !! - Potential duplicate medications found. Please discuss with provider. CONTINUE these medications which have NOT CHANGED    Details   albuterol (PROVENTIL VENTOLIN) 2.5 mg /3 mL (0.083 %) nebulizer solution Take 3 mL by inhalation every four (4) hours as needed for Wheezing. Qty: 25 Each, Refills: 2    Associated Diagnoses: Asthma      !! albuterol (PROVENTIL HFA, VENTOLIN HFA) 90 mcg/actuation inhaler Take 2 Puffs by inhalation daily as needed. !! - Potential duplicate medications found. Please discuss with provider. 2.   Follow-up Information     Follow up With Specialties Details Why Contact Info    Facundo Newman MD Family Practice In 1 week  [address] [facility]  [phone]          Return to ED if worse     Diagnosis     Clinical Impression:   1. Mild intermittent asthma with acute exacerbation        This note is prepared by Cindy Escalona, acting as Scribe for MD Beth Campos Caro, Caro, MD: The scribe's documentation has been prepared under my direction and personally reviewed by me in its entirety. I confirm that the note above accurately reflects all work, treatment, procedures, and medical decision making performed by me. This note will not be viewable in [address]

## 2019-07-26 NOTE — ED NOTES
Pt here for asthma flare up. Pt speaking in complete sentences NAd noted. + hx of smoker. According to pt last asthma attack has been a few months ago. Emergency Department Nursing Plan of Care       The Nursing Plan of Care is developed from the Nursing assessment and Emergency Department Attending provider initial evaluation. The plan of care may be reviewed in the ED Provider note.     The Plan of Care was developed with the following considerations:   Patient / Family readiness to learn indicated by:verbalized understanding  Persons(s) to be included in education: patient  Barriers to Learning/Limitations:No    Signed     Diane Sky RN    7/26/2019   1:42 PM

## 2020-02-25 ENCOUNTER — HOSPITAL ENCOUNTER (EMERGENCY)
Age: 25
Discharge: HOME | End: 2020-02-25
Attending: EMERGENCY MEDICINE
Payer: MEDICAID

## 2020-02-25 VITALS
SYSTOLIC BLOOD PRESSURE: 106 MMHG | WEIGHT: 128.75 LBS | HEIGHT: 62 IN | OXYGEN SATURATION: 99 % | BODY MASS INDEX: 23.69 KG/M2 | HEART RATE: 71 BPM | RESPIRATION RATE: 18 BRPM | DIASTOLIC BLOOD PRESSURE: 56 MMHG | TEMPERATURE: 98.4 F

## 2020-02-25 DIAGNOSIS — K52.9 GASTROENTERITIS, ACUTE: Primary | ICD-10-CM

## 2020-02-25 LAB
APPEARANCE UR: CLEAR
BACTERIA URNS QL MICRO: NEGATIVE /HPF
BILIRUB UR QL: NEGATIVE
COLOR UR: ABNORMAL
EPITH CASTS URNS QL MICRO: ABNORMAL /LPF
GLUCOSE UR STRIP.AUTO-MCNC: NEGATIVE MG/DL
HCG UR QL: NEGATIVE
HGB UR QL STRIP: ABNORMAL
KETONES UR QL STRIP.AUTO: NEGATIVE MG/DL
LEUKOCYTE ESTERASE UR QL STRIP.AUTO: NEGATIVE
MUCOUS THREADS URNS QL MICRO: ABNORMAL /LPF
NITRITE UR QL STRIP.AUTO: NEGATIVE
PH UR STRIP: 6.5 [PH] (ref 5–8)
PROT UR STRIP-MCNC: NEGATIVE MG/DL
RBC #/AREA URNS HPF: ABNORMAL /HPF (ref 0–5)
SP GR UR REFRACTOMETRY: 1.02 (ref 1–1.03)
UA: UC IF INDICATED,UAUC: ABNORMAL
UROBILINOGEN UR QL STRIP.AUTO: 1 EU/DL (ref 0.2–1)
WBC URNS QL MICRO: ABNORMAL /HPF (ref 0–4)

## 2020-02-25 PROCEDURE — 99284 EMERGENCY DEPT VISIT MOD MDM: CPT

## 2020-02-25 PROCEDURE — 81025 URINE PREGNANCY TEST: CPT

## 2020-02-25 PROCEDURE — 81001 URINALYSIS AUTO W/SCOPE: CPT

## 2020-02-25 RX ORDER — DICYCLOMINE HYDROCHLORIDE 10 MG/1
10 CAPSULE ORAL 4 TIMES DAILY
Qty: 20 CAP | Refills: 0 | Status: SHIPPED | OUTPATIENT
Start: 2020-02-25 | End: 2020-03-01

## 2020-02-25 RX ORDER — ONDANSETRON 8 MG/1
8 TABLET, ORALLY DISINTEGRATING ORAL
Qty: 20 TAB | Refills: 0 | Status: SHIPPED | OUTPATIENT
Start: 2020-02-25 | End: 2021-04-01

## 2020-02-25 RX ORDER — DIPHENOXYLATE HYDROCHLORIDE AND ATROPINE SULFATE 2.5; .025 MG/1; MG/1
1 TABLET ORAL
Qty: 20 TAB | Refills: 0 | Status: SHIPPED | OUTPATIENT
Start: 2020-02-25 | End: 2021-04-01

## 2020-02-25 NOTE — ED PROVIDER NOTES
EMERGENCY DEPARTMENT HISTORY AND PHYSICAL EXAM      Date: 2/25/2020  Patient Name: Lexie Terry    Please note that this dictation was completed with [employer], the computer voice recognition software. Quite often unanticipated grammatical, syntax, homophones, and other interpretive errors are inadvertently transcribed by the computer software. Please disregard these errors. Please excuse any errors that have escaped final proofreading. History of Presenting Illness     Chief Complaint   Patient presents with    Diarrhea       History Provided By: Patient    HPI: Lexie Terry, 25 y.o. female with PMHx significant for ADD and asthma, presents ambualtory to the ED with cc of about 8 bouts of diarrhea today that started this morning as well as 1 episode of vomiting today. Patient reports that yesterday she had [facility] and she believes that she is lactose intolerant. She reports that ever since she has been having this diarrhea. She denies any blood, color changes, recent antibiotic use, recent travel. She also thinks that she has been peeing more frequently but she is unsure if this is because she has been hydrating more. She reports that she is just coming off her menstrual period. She denies any fevers, chills, sick contacts, abdominal pain, nausea, dysuria, hematuria. PCP: Thai Melendez MD    There are no other complaints, changes, or physical findings at this time. Current Outpatient Medications   Medication Sig Dispense Refill    diphenoxylate-atropine (LOMOTIL) 2.5-0.025 mg per tablet Take 1 Tab by mouth four (4) times daily as needed for Diarrhea (1 tab after each stool for max 8 per day). Max Daily Amount: 4 Tabs. Take after each stool for a maximum of 8 tablets daily 20 Tab 0    dicyclomine (BENTYL) 10 mg capsule Take 1 Cap by mouth four (4) times daily for 5 days.  20 Cap 0    ondansetron (ZOFRAN ODT) 8 mg disintegrating tablet Take 1 Tab by mouth every eight (8) hours as needed for Nausea or Vomiting. 20 Tab 0    albuterol (PROVENTIL HFA, VENTOLIN HFA, PROAIR HFA) 90 mcg/actuation inhaler Take 2 Puffs by inhalation every four (4) hours as needed for Wheezing. 1 Inhaler 1    naproxen (NAPROSYN) 500 mg tablet Take 1 Tab by mouth two (2) times daily (with meals). 20 Tab 0    acetaminophen (TYLENOL) 325 mg tablet Take 2 Tabs by mouth every four (4) hours as needed for Pain. 20 Tab 0    promethazine (PHENERGAN) 25 mg tablet Take 1 Tab by mouth every six (6) hours as needed for Nausea. 12 Tab 0    promethazine (PHENERGAN) 25 mg tablet Take 1 Tab by mouth every six (6) hours as needed for Nausea. 12 Tab 0    amoxicillin 500 mg tab Take 500 mg by mouth three (3) times daily. 30 Tab 0    albuterol (PROVENTIL VENTOLIN) 2.5 mg /3 mL (0.083 %) nebulizer solution Take 3 mL by inhalation every four (4) hours as needed for Wheezing. 25 Each 2    albuterol (PROVENTIL HFA, VENTOLIN HFA) 90 mcg/actuation inhaler Take 2 Puffs by inhalation daily as needed. Past History     Past Medical History:  Past Medical History:   Diagnosis Date    ADD (attention deficit disorder)     Asthma     grew out of it per pt       Past Surgical History:  Past Surgical History:   Procedure Laterality Date    HX  SECTION         Family History:  Family History   Problem Relation Age of Onset    Psychiatric Disorder Mother        Social History:  Social History     Tobacco Use    Smoking status: Current Every Day Smoker    Smokeless tobacco: Never Used   Substance Use Topics    Alcohol use: Yes     Comment: socially     Drug use: No       Allergies:  No Known Allergies      Review of Systems   Review of Systems   Constitutional: Negative. Negative for chills and fever. Eyes: Negative for photophobia, pain, redness and visual disturbance. Respiratory: Negative for cough and shortness of breath. Cardiovascular: Negative for chest pain and palpitations.    Gastrointestinal: Positive for diarrhea and vomiting. Negative for abdominal pain and nausea. Genitourinary: Positive for frequency. Negative for dysuria and hematuria. Musculoskeletal: Negative for arthralgias and myalgias. Skin: Negative for color change, rash and wound. Neurological: Negative for numbness and headaches. All other systems reviewed and are negative. Physical Exam   Physical Exam  Vitals signs and nursing note reviewed. Constitutional:       General: She is not in acute distress. Appearance: She is well-developed. She is not diaphoretic. Comments: 25 y.o. female in NAD  Communicates appropriately and in full sentences   HENT:      Head: Normocephalic and atraumatic. Eyes:      General:         Right eye: No discharge. Left eye: No discharge. Conjunctiva/sclera: Conjunctivae normal.      Pupils: Pupils are equal, round, and reactive to light. Neck:      Musculoskeletal: Normal range of motion and neck supple. Comments: No nuchal rigidity  Cardiovascular:      Rate and Rhythm: Normal rate and regular rhythm. Pulses: Normal pulses. Heart sounds: Normal heart sounds. Pulmonary:      Effort: Pulmonary effort is normal. No respiratory distress. Breath sounds: Normal breath sounds. No wheezing. Abdominal:      General: Abdomen is flat. There is no distension. Palpations: Abdomen is soft. Tenderness: There is no abdominal tenderness. There is no right CVA tenderness or left CVA tenderness. Musculoskeletal: Normal range of motion. General: No tenderness or deformity. Comments: No neurologic or vascular compromise on exam.    Skin:     General: Skin is warm and dry. Coloration: Skin is not pale. Findings: No erythema or rash. Neurological:      Mental Status: She is alert and oriented to person, place, and time.       Coordination: Coordination normal.           Diagnostic Study Results     Labs -     Recent Results (from the past 12 hour(s)) HCG URINE, QL. - POC    Collection Time: 02/25/20  5:14 PM   Result Value Ref Range    Pregnancy test,urine (POC) NEGATIVE  NEG     URINALYSIS W/ REFLEX CULTURE    Collection Time: 02/25/20  5:15 PM   Result Value Ref Range    Color YELLOW/STRAW      Appearance CLEAR CLEAR      Specific gravity 1.025 1.003 - 1.030      pH (UA) 6.5 5.0 - 8.0      Protein NEGATIVE  NEG mg/dL    Glucose NEGATIVE  NEG mg/dL    Ketone NEGATIVE  NEG mg/dL    Bilirubin NEGATIVE  NEG      Blood TRACE (A) NEG      Urobilinogen 1.0 0.2 - 1.0 EU/dL    Nitrites NEGATIVE  NEG      Leukocyte Esterase NEGATIVE  NEG      WBC 0-4 0 - 4 /hpf    RBC 0-5 0 - 5 /hpf    Epithelial cells FEW FEW /lpf    Bacteria NEGATIVE  NEG /hpf    UA:UC IF INDICATED CULTURE NOT INDICATED BY UA RESULT CNI      Mucus 1+ (A) NEG /lpf         Medical Decision Making   I am the first provider for this patient. I reviewed the vital signs, available nursing notes, past medical history, past surgical history, family history and social history. Vital Signs-Reviewed the patient's vital signs. Patient Vitals for the past 12 hrs:   Temp Pulse Resp BP SpO2   02/25/20 1821 -- 71 18 106/56 99 %   02/25/20 1611 98.4 °F (36.9 °C) 96 20 111/60 99 %         Records Reviewed: Nursing Notes and Old Medical Records    Provider Notes (Medical Decision Making):   Differential diagnosis includes UTI, pregnancy, dehydration, electrolyte abnormality, viral syndrome, gastroenteritis. 80-year-old female with no past medical history presents with about 8 bouts of diarrhea today and one episode of vomiting. No nausea. Has not experienced an episode of vomiting since this morning. She reports that she is relatively asymptomatic besides diarrhea. Patient suspect that she is lactose intolerant she is suspected so for years. She had a large pizza last night from [facility]. Denies any abdominal pain.   On exam, she has a benign abdominal exam which is nonsurgical.  Vital signs are stable. Will discharge with symptomatic management and urged close primary care doctor follow-up. ED Course:   Initial assessment performed. The patients presenting problems have been discussed, and they are in agreement with the care plan formulated and outlined with them. I have encouraged them to ask questions as they arise throughout their visit. DISCHARGE NOTE:  Cony Gillis's  results have been reviewed with her. She has been counseled regarding her diagnosis. She verbally conveys understanding and agreement of the signs, symptoms, diagnosis, treatment and prognosis and additionally agrees to follow up as recommended with Dr. Jairon Jamison MD in 24 - 48 hours. She also agrees with the care-plan and conveys that all of her questions have been answered. I have also put together some discharge instructions for her that include: 1) educational information regarding their diagnosis, 2) how to care for their diagnosis at home, as well a 3) list of reasons why they would want to return to the ED prior to their follow-up appointment, should their condition change. She and/or family's questions have been answered. I have encouraged them to see the official results in [facility]\" or to retrieve the specifics of their results from medical records. PLAN:  1. Return precautions as discussed  2. Follow-up with providers as directed  3. Medications as prescribed    Return to ED if worse     Diagnosis     Clinical Impression:   1. Gastroenteritis, acute        Discharge Medication List as of 2/25/2020  5:58 PM      START taking these medications    Details   diphenoxylate-atropine (LOMOTIL) 2.5-0.025 mg per tablet Take 1 Tab by mouth four (4) times daily as needed for Diarrhea (1 tab after each stool for max 8 per day). Max Daily Amount: 4 Tabs.  Take after each stool for a maximum of 8 tablets daily, Normal, Disp-20 Tab, R-0      dicyclomine (BENTYL) 10 mg capsule Take 1 Cap by mouth four (4) times daily for 5 days. , Normal, Disp-20 Cap, R-0      ondansetron (ZOFRAN ODT) 8 mg disintegrating tablet Take 1 Tab by mouth every eight (8) hours as needed for Nausea or Vomiting., Normal, Disp-20 Tab, R-0         CONTINUE these medications which have NOT CHANGED    Details   !! albuterol (PROVENTIL HFA, VENTOLIN HFA, PROAIR HFA) 90 mcg/actuation inhaler Take 2 Puffs by inhalation every four (4) hours as needed for Wheezing., Normal, Disp-1 Inhaler, R-1      naproxen (NAPROSYN) 500 mg tablet Take 1 Tab by mouth two (2) times daily (with meals). , Normal, Disp-20 Tab, R-0      acetaminophen (TYLENOL) 325 mg tablet Take 2 Tabs by mouth every four (4) hours as needed for Pain., Normal, Disp-20 Tab, R-0      !! promethazine (PHENERGAN) 25 mg tablet Take 1 Tab by mouth every six (6) hours as needed for Nausea., Normal, Disp-12 Tab, R-0      !! promethazine (PHENERGAN) 25 mg tablet Take 1 Tab by mouth every six (6) hours as needed for Nausea., Normal, Disp-12 Tab, R-0      amoxicillin 500 mg tab Take 500 mg by mouth three (3) times daily. , Print, Disp-30 Tab, R-0      albuterol (PROVENTIL VENTOLIN) 2.5 mg /3 mL (0.083 %) nebulizer solution Take 3 mL by inhalation every four (4) hours as needed for Wheezing., Print, Disp-25 Each, R-2      !! albuterol (PROVENTIL HFA, VENTOLIN HFA) 90 mcg/actuation inhaler Take 2 Puffs by inhalation daily as needed.  , Historical Med       !! - Potential duplicate medications found. Please discuss with provider. Follow-up Information     Follow up With Specialties Details Why Contact Info    Sherman Amaya MD Family Practice Schedule an appointment as soon as possible for a visit in 2 days If symptoms worsen [address] [facility] [address]      [address] [facility] EMERGENCY DEPT Emergency Medicine Go to If symptoms worsen Gian Dow              This note will not be viewable in [address]

## 2020-02-25 NOTE — ED NOTES

## 2020-02-25 NOTE — DISCHARGE INSTRUCTIONS
Patient Education        Gastroenteritis: Care Instructions  Your Care Instructions    Gastroenteritis is an illness that may cause nausea, vomiting, and diarrhea. It is sometimes called \"stomach flu. \" It can be caused by bacteria or a virus. You will probably begin to feel better in 1 to 2 days. In the meantime, get plenty of rest and make sure you do not become dehydrated. Dehydration occurs when your body loses too much fluid. Follow-up care is a key part of your treatment and safety. Be sure to make and go to all appointments, and call your doctor if you are having problems. It's also a good idea to know your test results and keep a list of the medicines you take. How can you care for yourself at home? · If your doctor prescribed antibiotics, take them as directed. Do not stop taking them just because you feel better. You need to take the full course of antibiotics. · Drink plenty of fluids to prevent dehydration, enough so that your urine is light yellow or clear like water. Choose water and other caffeine-free clear liquids until you feel better. If you have kidney, heart, or liver disease and have to limit fluids, talk with your doctor before you increase your fluid intake. · Drink fluids slowly, in frequent, small amounts, because drinking too much too fast can cause vomiting. · Begin eating mild foods, such as dry toast, yogurt, applesauce, bananas, and rice. Avoid spicy, hot, or high-fat foods, and do not drink alcohol or caffeine for a day or two. Do not drink milk or eat ice cream until you are feeling better. How to prevent gastroenteritis  · Keep hot foods hot and cold foods cold. · Do not eat meats, dressings, salads, or other foods that have been kept at room temperature for more than 2 hours. · Use a thermometer to check your refrigerator. It should be between 34°F and 40°F.  · Defrost meats in the refrigerator or microwave, not on the kitchen counter.   · Keep your hands and your kitchen clean. Wash your hands, cutting boards, and countertops with hot soapy water frequently. · Cook meat until it is well done. · Do not eat raw eggs or uncooked sauces made with raw eggs. · Do not take chances. If food looks or tastes spoiled, throw it out. When should you call for help? Call 911 anytime you think you may need emergency care. For example, call if:    · You vomit blood or what looks like coffee grounds.     · You passed out (lost consciousness).     · You pass maroon or very bloody stools.    Call your doctor now or seek immediate medical care if:    · You have severe belly pain.     · You have signs of needing more fluids. You have sunken eyes, a dry mouth, and pass only a little dark urine.     · You feel like you are going to faint.     · You have increased belly pain that does not go away in 1 to 2 days.     · You have new or increased nausea, or you are vomiting.     · You have a new or higher fever.     · Your stools are black and tarlike or have streaks of blood.    Watch closely for changes in your health, and be sure to contact your doctor if:    · You are dizzy or lightheaded.     · You urinate less than usual, or your urine is dark yellow or brown.     · You do not feel better with each day that goes by. Where can you learn more? Go to http://lorraine-scooby.info/. Enter N142 in the search box to learn more about \"Gastroenteritis: Care Instructions. \"  Current as of: June 9, 2019  Content Version: 12.2  © 9580-6548 Healthwise, Incorporated. Care instructions adapted under license by [employer] (which disclaims liability or warranty for this information). If you have questions about a medical condition or this instruction, always ask your healthcare professional. [place] [address] any warranty or liability for your use of this information.

## 2020-02-25 NOTE — ED NOTES
Patient presents to the ED with c/o abdominal pain x1 day. Pt reports vomiting once this morning and is no longer nauseous. Pt reports diarrhea. Pt reports urinary frequency. Pt is alert and oriented. Pt skin is warm and dry. Pt is ambulatory independently. Emergency Department Nursing Plan of Care       The Nursing Plan of Care is developed from the Nursing assessment and Emergency Department Attending provider initial evaluation. The plan of care may be reviewed in the ED Provider note.     The Plan of Care was developed with the following considerations:   Patient / Family readiness to learn indicated by:verbalized understanding  Persons(s) to be included in education: patient  Barriers to Learning/Limitations:No    Signed     Syd Jacobs    2/25/2020   4:27 PM

## 2020-02-25 NOTE — LETTER
[address] 
[facility] EMERGENCY DEPT 
[address] [address] 
[phone] Work/School Note Date: 2/25/2020 To Whom It May concern: 
 
Caesar Band was seen and treated today in the emergency room by the following provider(s): 
Attending Provider: Javier Jimenez MD 
Physician Assistant: PAULINA Herrmann. Caesar Band may return to work on 2/28/2020. Sincerely, 
 
 
 
 
Audra Cornell.  [place], [place]

## 2020-03-17 ENCOUNTER — HOSPITAL ENCOUNTER (EMERGENCY)
Age: 25
Discharge: HOME | End: 2020-03-17
Attending: EMERGENCY MEDICINE
Payer: MEDICAID

## 2020-03-17 ENCOUNTER — APPOINTMENT (OUTPATIENT)
Dept: GENERAL RADIOLOGY | Age: 25
End: 2020-03-17
Attending: NURSE PRACTITIONER
Payer: MEDICAID

## 2020-03-17 VITALS
SYSTOLIC BLOOD PRESSURE: 109 MMHG | BODY MASS INDEX: 23 KG/M2 | DIASTOLIC BLOOD PRESSURE: 61 MMHG | HEIGHT: 62 IN | TEMPERATURE: 99.2 F | WEIGHT: 125 LBS | OXYGEN SATURATION: 100 % | HEART RATE: 118 BPM | RESPIRATION RATE: 16 BRPM

## 2020-03-17 DIAGNOSIS — M25.532 LEFT WRIST PAIN: Primary | ICD-10-CM

## 2020-03-17 LAB — HCG UR QL: NEGATIVE

## 2020-03-17 PROCEDURE — 81025 URINE PREGNANCY TEST: CPT

## 2020-03-17 PROCEDURE — 73110 X-RAY EXAM OF WRIST: CPT

## 2020-03-17 PROCEDURE — 96372 THER/PROPH/DIAG INJ SC/IM: CPT

## 2020-03-17 PROCEDURE — 74011250636 HC RX REV CODE- 250/636: Performed by: NURSE PRACTITIONER

## 2020-03-17 PROCEDURE — 99283 EMERGENCY DEPT VISIT LOW MDM: CPT

## 2020-03-17 RX ORDER — KETOROLAC TROMETHAMINE 30 MG/ML
30 INJECTION, SOLUTION INTRAMUSCULAR; INTRAVENOUS
Status: COMPLETED | OUTPATIENT
Start: 2020-03-17 | End: 2020-03-17

## 2020-03-17 RX ORDER — NAPROXEN 500 MG/1
500 TABLET ORAL 2 TIMES DAILY WITH MEALS
Qty: 20 TAB | Refills: 0 | Status: SHIPPED | OUTPATIENT
Start: 2020-03-17 | End: 2020-03-27

## 2020-03-17 RX ADMIN — KETOROLAC TROMETHAMINE 30 MG: 30 INJECTION, SOLUTION INTRAMUSCULAR; INTRAVENOUS at 19:06

## 2020-03-17 NOTE — ED NOTES
Pt presents to ED ambulatory complaining of left wrist pain that radiates up to her shoulder x5 days. Pt reports she started a new job at [employer]. Pt reports taking motrin and aleeve without relief. AROM and pulses intact. Pt is alert and oriented x 4, RR even and unlabored, skin is warm and dry. Assessment completed and pt updated on plan of care. Emergency Department Nursing Plan of Care       The Nursing Plan of Care is developed from the Nursing assessment and Emergency Department Attending provider initial evaluation. The plan of care may be reviewed in the ED Provider note.     The Plan of Care was developed with the following considerations:   Patient / Family readiness to learn indicated by:verbalized understanding  Persons(s) to be included in education: patient  Barriers to Learning/Limitations:No    Signed     Nery Vargas RN    3/17/2020   6:48 PM

## 2020-03-17 NOTE — DISCHARGE INSTRUCTIONS
Patient Education     Musculoskeletal Pain: Care Instructions  Your Care Instructions  Different problems with the bones, muscles, nerves, ligaments, and tendons in the body can cause pain. One or more areas of your body may ache or burn. Or they may feel tired, stiff, or sore. The medical term for this type of pain is musculoskeletal pain. It can have many different causes. Sometimes the pain is caused by an injury such as a strain or sprain. Or you might have pain from using one part of your body in the same way over and over again. This is called overuse. In some cases, the cause of the pain is another health problem such as arthritis or fibromyalgia. The doctor will examine you and ask you questions about your health to help find the cause of your pain. Blood tests or imaging tests like an X-ray may also be helpful. But sometimes doctors can't find a cause of the pain. Treatment depends on your symptoms and the cause of the pain, if known. The doctor has checked you carefully, but problems can develop later. If you notice any problems or new symptoms, get medical treatment right away. Follow-up care is a key part of your treatment and safety. Be sure to make and go to all appointments, and call your doctor if you are having problems. It's also a good idea to know your test results and keep a list of the medicines you take. How can you care for yourself at home? · Rest until you feel better. · Do not do anything that makes the pain worse. Return to exercise gradually if you feel better and your doctor says it's okay. · Be safe with medicines. Read and follow all instructions on the label. ¨ If the doctor gave you a prescription medicine for pain, take it as prescribed. ¨ If you are not taking a prescription pain medicine, ask your doctor if you can take an over-the-counter medicine. · Put ice or a cold pack on the area for 10 to 20 minutes at a time to ease pain.  Put a thin cloth between the ice and your skin. When should you call for help? Call your doctor now or seek immediate medical care if:  · You have new pain, or your pain gets worse. · You have new symptoms such as a fever, a rash, or chills. Watch closely for changes in your health, and be sure to contact your doctor if:  · You do not get better as expected. Where can you learn more? Go to [employer].be  Enter Q624 in the search box to learn more about \"Musculoskeletal Pain: Care Instructions. \"   © 2308-7347 Healthwise, Incorporated. Care instructions adapted under license by [facility] (which disclaims liability or warranty for this information). This care instruction is for use with your licensed healthcare professional. If you have questions about a medical condition or this instruction, always ask your healthcare professional. [address] any warranty or liability for your use of this information.   Content Version: 05.1.769008; Current as of: November 20, 2015

## 2020-03-18 NOTE — ED PROVIDER NOTES
EMERGENCY DEPARTMENT HISTORY AND PHYSICAL EXAM      Date: 3/17/2020  Patient Name: Shraddha Manuel    History of Presenting Illness     Chief Complaint   Patient presents with    Wrist Pain       History Provided By: Patient    HPI: Shraddha Manuel, 25 y.o. female presents by POV to the ED with cc of left wrist pain. 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  Patient states she has been taking over-the-counter ibuprofen without relief of symptoms. Patient denies injury, fall, direct contact. Never states the pain radiates from her wrist all the way to her shoulder. However, pain started in her wrist initially. There are no other complaints, changes, or physical findings at this time. PCP: Sondra Nguyen MD    No current facility-administered medications on file prior to encounter. Current Outpatient Medications on File Prior to Encounter   Medication Sig Dispense Refill    diphenoxylate-atropine (LOMOTIL) 2.5-0.025 mg per tablet Take 1 Tab by mouth four (4) times daily as needed for Diarrhea (1 tab after each stool for max 8 per day). Max Daily Amount: 4 Tabs. Take after each stool for a maximum of 8 tablets daily 20 Tab 0    ondansetron (ZOFRAN ODT) 8 mg disintegrating tablet Take 1 Tab by mouth every eight (8) hours as needed for Nausea or Vomiting. 20 Tab 0    albuterol (PROVENTIL HFA, VENTOLIN HFA, PROAIR HFA) 90 mcg/actuation inhaler Take 2 Puffs by inhalation every four (4) hours as needed for Wheezing. 1 Inhaler 1    naproxen (NAPROSYN) 500 mg tablet Take 1 Tab by mouth two (2) times daily (with meals). 20 Tab 0    acetaminophen (TYLENOL) 325 mg tablet Take 2 Tabs by mouth every four (4) hours as needed for Pain. 20 Tab 0    promethazine (PHENERGAN) 25 mg tablet Take 1 Tab by mouth every six (6) hours as needed for Nausea.  12 Tab 0    promethazine (PHENERGAN) 25 mg tablet Take 1 Tab by mouth every six (6) hours as needed for Nausea. 12 Tab 0    amoxicillin 500 mg tab Take 500 mg by mouth three (3) times daily. 30 Tab 0    albuterol (PROVENTIL VENTOLIN) 2.5 mg /3 mL (0.083 %) nebulizer solution Take 3 mL by inhalation every four (4) hours as needed for Wheezing. 25 Each 2    albuterol (PROVENTIL HFA, VENTOLIN HFA) 90 mcg/actuation inhaler Take 2 Puffs by inhalation daily as needed. Past History     Past Medical History:  Past Medical History:   Diagnosis Date    ADD (attention deficit disorder)     Asthma     grew out of it per pt       Past Surgical History:  Past Surgical History:   Procedure Laterality Date    HX  SECTION         Family History:  Family History   Problem Relation Age of Onset    Psychiatric Disorder Mother        Social History:  Social History     Tobacco Use    Smoking status: Current Every Day Smoker     Packs/day: 0.25    Smokeless tobacco: Never Used    Tobacco comment: Black and Milds   Substance Use Topics    Alcohol use: Yes     Comment: socially     Drug use: No       Allergies:  No Known Allergies      Review of Systems   Review of Systems   Constitutional: Negative for chills and fever. HENT: Negative for congestion, rhinorrhea and sore throat. Respiratory: Negative for cough and shortness of breath. Cardiovascular: Negative for chest pain. Gastrointestinal: Negative for abdominal pain, nausea and vomiting. Endocrine: Negative for polyuria. Genitourinary: Negative for dysuria and frequency. Musculoskeletal: Positive for arthralgias, joint swelling and myalgias. Skin: Negative for rash. Neurological: Negative for dizziness, weakness and headaches. All other systems reviewed and are negative. Physical Exam   Physical Exam  Vitals signs and nursing note reviewed. Constitutional:       General: She is not in acute distress. Appearance: Normal appearance. She is well-developed and normal weight.  She is not ill-appearing, toxic-appearing or diaphoretic. Comments: 25 y.o. female   HENT:      Head: Normocephalic and atraumatic. Nose: Nose normal.      Mouth/Throat:      Mouth: Mucous membranes are moist.      Pharynx: Oropharynx is clear. Eyes:      Conjunctiva/sclera: Conjunctivae normal.      Pupils: Pupils are equal, round, and reactive to light. Neck:      Musculoskeletal: Normal range of motion and neck supple. No neck rigidity or muscular tenderness. Vascular: No carotid bruit. Cardiovascular:      Rate and Rhythm: Normal rate and regular rhythm. Heart sounds: Normal heart sounds. No murmur. Pulmonary:      Effort: Pulmonary effort is normal. No respiratory distress. Breath sounds: Normal breath sounds. No wheezing. Musculoskeletal: Normal range of motion. General: Tenderness present. Left wrist: She exhibits tenderness and bony tenderness. She exhibits normal range of motion, no swelling, no effusion, no crepitus, no deformity and no laceration. Lymphadenopathy:      Cervical: No cervical adenopathy. Skin:     General: Skin is warm and dry. Capillary Refill: Capillary refill takes less than 2 seconds. Neurological:      General: No focal deficit present. Mental Status: She is alert and oriented to person, place, and time. Mental status is at baseline. Psychiatric:         Behavior: Behavior normal.         Diagnostic Study Results     Labs -     Recent Results (from the past 12 hour(s))   HCG URINE, QL. - POC    Collection Time: 03/17/20  6:53 PM   Result Value Ref Range    Pregnancy test,urine (POC) NEGATIVE  NEG         Radiologic Studies -   XR WRIST LT AP/LAT/OBL MIN 3V   Final Result   IMPRESSION:       Normal left wrist views. Medical Decision Making   I am the first provider for this patient.     I reviewed the vital signs, available nursing notes, past medical history, past surgical history, family history and social history. Vital Signs-Reviewed the patient's vital signs. Patient Vitals for the past 12 hrs:   Temp Pulse Resp BP SpO2   03/17/20 1739 99.2 °F (37.3 °C) (!) 118 16 109/61 100 %       Records Reviewed: Nursing Notes, Old Medical Records, Previous Radiology Studies and Previous Laboratory Studies    Provider Notes (Medical Decision Making):   Differential diagnosis include carpal tunnel syndrome, muscle sprain or strain, wrist fracture or dislocation. ED Course:   Initial assessment performed. The patients presenting problems have been discussed, and they are in agreement with the care plan formulated and outlined with them. I have encouraged them to ask questions as they arise throughout their visit. Discussed with the patient there was no acute abnormality of the left wrist x-ray however, patient continues to complain of right wrist pain and tenderness. She was given a Velcro wrist splint and anti-inflammatory medication and was told to try to rest the extremity. If pain becomes persistent, she should follow-up with her primary care provider or [address] [place] [address] as needed. Otherwise she can come back to the emergency department if symptoms worsen. Critical Care Time: None    Disposition:  DISCHARGE NOTE:  7:37 PM  The pt is ready for discharge. The pt's signs, symptoms, diagnosis, and discharge instructions have been discussed and pt has conveyed their understanding. The pt is to follow up as recommended or return to ER should their symptoms worsen. Plan has been discussed and pt is in agreement. Jn Perez NP  3/17/2020      PLAN:  1. Discharge Medication List as of 3/17/2020  7:37 PM      START taking these medications    Details   !! naproxen (Naprosyn) 500 mg tablet Take 1 Tab by mouth two (2) times daily (with meals) for 10 days. , Normal, Disp-20 Tab, R-0       !! - Potential duplicate medications found. Please discuss with provider.       CONTINUE these medications which have NOT CHANGED    Details   diphenoxylate-atropine (LOMOTIL) 2.5-0.025 mg per tablet Take 1 Tab by mouth four (4) times daily as needed for Diarrhea (1 tab after each stool for max 8 per day). Max Daily Amount: 4 Tabs. Take after each stool for a maximum of 8 tablets daily, Normal, Disp-20 Tab, R-0      ondansetron (ZOFRAN ODT) 8 mg disintegrating tablet Take 1 Tab by mouth every eight (8) hours as needed for Nausea or Vomiting., Normal, Disp-20 Tab, R-0      !! albuterol (PROVENTIL HFA, VENTOLIN HFA, PROAIR HFA) 90 mcg/actuation inhaler Take 2 Puffs by inhalation every four (4) hours as needed for Wheezing., Normal, Disp-1 Inhaler, R-1      !! naproxen (NAPROSYN) 500 mg tablet Take 1 Tab by mouth two (2) times daily (with meals). , Normal, Disp-20 Tab, R-0      acetaminophen (TYLENOL) 325 mg tablet Take 2 Tabs by mouth every four (4) hours as needed for Pain., Normal, Disp-20 Tab, R-0      !! promethazine (PHENERGAN) 25 mg tablet Take 1 Tab by mouth every six (6) hours as needed for Nausea., Normal, Disp-12 Tab, R-0      !! promethazine (PHENERGAN) 25 mg tablet Take 1 Tab by mouth every six (6) hours as needed for Nausea., Normal, Disp-12 Tab, R-0      amoxicillin 500 mg tab Take 500 mg by mouth three (3) times daily. , Print, Disp-30 Tab, R-0      albuterol (PROVENTIL VENTOLIN) 2.5 mg /3 mL (0.083 %) nebulizer solution Take 3 mL by inhalation every four (4) hours as needed for Wheezing., Print, Disp-25 Each, R-2      !! albuterol (PROVENTIL HFA, VENTOLIN HFA) 90 mcg/actuation inhaler Take 2 Puffs by inhalation daily as needed.  , Historical Med       !! - Potential duplicate medications found. Please discuss with provider.         2.   Follow-up Information     Follow up With Specialties Details Why [address] [facility] EMERGENCY DEPT Emergency Medicine Go in 1 week As needed, If symptoms worsen [address] Joshi MD Family Practice Schedule an appointment as soon as possible for a visit in 3 days As needed, If symptoms worsen [place]  [phone]          Return to ED if worse     Diagnosis     Clinical Impression:   1. Left wrist pain          Please note that this dictation was completed with [employer], the computer voice recognition software. Quite often unanticipated grammatical, syntax, homophones, and other interpretive errors are inadvertently transcribed by the computer software. Please disregards these errors. Please excuse any errors that have escaped final proofreading. This note will not be viewable in [address]

## 2020-12-18 ENCOUNTER — HOSPITAL ENCOUNTER (EMERGENCY)
Age: 25
Discharge: HOME | End: 2020-12-18
Attending: EMERGENCY MEDICINE
Payer: MEDICAID

## 2020-12-18 VITALS
HEIGHT: 62 IN | OXYGEN SATURATION: 100 % | RESPIRATION RATE: 16 BRPM | WEIGHT: 137.5 LBS | TEMPERATURE: 98 F | SYSTOLIC BLOOD PRESSURE: 120 MMHG | BODY MASS INDEX: 25.3 KG/M2 | DIASTOLIC BLOOD PRESSURE: 60 MMHG | HEART RATE: 90 BPM

## 2020-12-18 DIAGNOSIS — R21 RASH AND OTHER NONSPECIFIC SKIN ERUPTION: ICD-10-CM

## 2020-12-18 DIAGNOSIS — L28.2 PRURITIC RASH: ICD-10-CM

## 2020-12-18 DIAGNOSIS — L30.9 ECZEMA, UNSPECIFIED TYPE: Primary | ICD-10-CM

## 2020-12-18 DIAGNOSIS — Z72.0 TOBACCO ABUSE: ICD-10-CM

## 2020-12-18 PROCEDURE — 74011250637 HC RX REV CODE- 250/637: Performed by: EMERGENCY MEDICINE

## 2020-12-18 PROCEDURE — 99283 EMERGENCY DEPT VISIT LOW MDM: CPT

## 2020-12-18 PROCEDURE — 74011636637 HC RX REV CODE- 636/637: Performed by: EMERGENCY MEDICINE

## 2020-12-18 RX ORDER — PREDNISONE 20 MG/1
60 TABLET ORAL ONCE
Status: COMPLETED | OUTPATIENT
Start: 2020-12-18 | End: 2020-12-18

## 2020-12-18 RX ORDER — DIPHENHYDRAMINE HCL 25 MG
50 CAPSULE ORAL
Qty: 20 CAP | Refills: 0 | Status: SHIPPED | OUTPATIENT
Start: 2020-12-18 | End: 2020-12-28

## 2020-12-18 RX ORDER — DIPHENHYDRAMINE HCL 25 MG
50 CAPSULE ORAL
Status: COMPLETED | OUTPATIENT
Start: 2020-12-18 | End: 2020-12-18

## 2020-12-18 RX ORDER — MUPIROCIN CALCIUM 20 MG/G
CREAM TOPICAL 2 TIMES DAILY
Qty: 15 G | Refills: 0 | Status: SHIPPED | OUTPATIENT
Start: 2020-12-18 | End: 2021-04-01

## 2020-12-18 RX ORDER — TRIAMCINOLONE ACETONIDE 1 MG/G
OINTMENT TOPICAL 2 TIMES DAILY
Qty: 30 G | Refills: 0 | Status: SHIPPED | OUTPATIENT
Start: 2020-12-18 | End: 2021-04-01

## 2020-12-18 RX ORDER — PREDNISONE 10 MG/1
TABLET ORAL
Qty: 21 TAB | Refills: 0 | Status: SHIPPED | OUTPATIENT
Start: 2020-12-18 | End: 2021-04-01

## 2020-12-18 RX ADMIN — PREDNISONE 60 MG: 20 TABLET ORAL at 22:01

## 2020-12-18 RX ADMIN — DIPHENHYDRAMINE HYDROCHLORIDE 50 MG: 25 CAPSULE ORAL at 22:01

## 2020-12-18 NOTE — LETTER
[facility] EMERGENCY DEPT 
[address] [address] 
[phone] Work/School Note Date: 12/18/2020 To Whom It May concern: 
 
Bill Pacheco was seen and treated today in the emergency room by the following provider(s): 
Attending Provider: Tenzin Yee MD. Bill Pacheco may return to work on 12/21/20. Sincerely, Stephanie Stark MD

## 2020-12-19 NOTE — ED NOTES
..Discharge summary and discharge medications reviewed with patient and appropriate educational materials and side effects teaching were provided. patient  Given 0 paper prescriptions and 4 electronic prescriptions sent to pt's listed pharmacy. Patient verbalized understanding of the importance of discussing medications with his or her physician or clinic they will be following up with. No si/s of acute distress prior to discharge. Patient offered wheelchair from treatment area to hospital entrance, patient declined wheelchair.

## 2020-12-19 NOTE — ED NOTES
Patient with hx of eczema presents to the ED with c/o a rash on her body that started on her chest 1.5 weeks ago and is constantly spreading. Pt stated she thought it was her normal irritated skin so she put hydrocortisone cream on which is making it more irritated. Pt reports it itching. Denies pain. Stated she doesn't have a dermatologist anymore to follow up with. Pt has opened pustules on her chest, arms and buttocks. Pt is alert, oriented and appropriate. Emergency Department Nursing Plan of Care       The Nursing Plan of Care is developed from the Nursing assessment and Emergency Department Attending provider initial evaluation. The plan of care may be reviewed in the ED Provider note.     The Plan of Care was developed with the following considerations:   Patient / Family readiness to learn indicated by:verbalized understanding  Persons(s) to be included in education: patient  Barriers to Learning/Limitations:No    Signed     Paul Heart    12/18/2020   9:19 PM

## 2020-12-19 NOTE — DISCHARGE INSTRUCTIONS
Patient Education        Atopic Dermatitis: Care Instructions  Your Care Instructions  Atopic dermatitis (also called eczema) is a skin problem that causes intense itching and a red, raised rash. In severe cases, the rash develops clear fluid-filled blisters. The rash is not contagious. People with this condition seem to have very sensitive immune systems that are likely to react to things that cause allergies. The immune system is the body's way of fighting infection. There is no cure for atopic dermatitis, but you may be able to control it with care at home. Follow-up care is a key part of your treatment and safety. Be sure to make and go to all appointments, and call your doctor if you are having problems. It's also a good idea to know your test results and keep a list of the medicines you take. How can you care for yourself at home? · Use moisturizer at least twice a day. · If your doctor prescribes a cream, use it as directed. If your doctor prescribes other medicine, take it exactly as directed. · Wash the affected area with water only. Soap can make dryness and itching worse. Pat dry. · Apply a moisturizer after bathing. Use a cream such as Lubriderm, Moisturel, or Cetaphil that does not irritate the skin or cause a rash. Apply the cream while your skin is still damp after lightly drying with a towel. · Use cold, wet cloths to reduce itching. · Keep cool, and stay out of the sun. · If itching affects your normal activities, an over-the-counter antihistamine, such as diphenhydramine (Benadryl) or loratadine (Claritin) may help. Read and follow all instructions on the label. When should you call for help? Call your doctor now or seek immediate medical care if:    · Your rash gets worse and you have a fever.     · You have new blisters or bruises, or the rash spreads and looks like a sunburn.     · You have signs of infection, such as:  ? Increased pain, swelling, warmth, or redness.   ? Red streaks leading from the rash. ? Pus draining from the rash. ? A fever.     · You have crusting or oozing sores.     · You have joint aches or body aches along with your rash. Watch closely for changes in your health, and be sure to contact your doctor if:    · Your rash does not clear up after 2 to 3 weeks of home treatment.     · Itching interferes with your sleep or daily activities. Where can you learn more? Go to http://lorraine-scooby.info/  Enter I585 in the search box to learn more about \"Atopic Dermatitis: Care Instructions. \"  Current as of: July 2, 2020               Content Version: 12.6  © 3308-1657 "[employer]". Care instructions adapted under license by [employer] (which disclaims liability or warranty for this information). If you have questions about a medical condition or this instruction, always ask your healthcare professional. [address] any warranty or liability for your use of this information.

## 2020-12-19 NOTE — ED PROVIDER NOTES
EMERGENCY DEPARTMENT HISTORY AND PHYSICAL EXAM      Please note that this dictation was completed with the assistance of \"Dragon\", the computer voice recognition software. Quite often unanticipated grammatical, syntax, homophones, and other interpretive errors are inadvertently transcribed by the computer software. Please disregard these errors and any errors that have escaped final proofreading. Thank you. Patient Name: Olesya Peralta  : 1995  MRN: [id number]  History of Presenting Illness     Chief Complaint   Patient presents with    Skin Problem       History Provided By: Patient    HPI: Olesya Peralta, 22 y.o. female with past medical history as documented below presents to the ED with c/o of 2 weeks of pruritic generalized rash. Patient reports rash has been itchy and progressive. Patient states that she first noticed a rash on her chest and states that has spread now to both of her arms and scattered areas on her upper legs. Patient denies any pain, drainage. Patient reports rash is consistent with her baseline eczema. She has tried 1% hydrocortisone cream without relief. Patient denies any fevers, joint swelling, new soaps or detergents. Denies any new medications, denies any involvement inside her mouth or eyes. Pt denies any other alleviating or exacerbating factors. Additionally, pt specifically denies any recent fever, chills, headache, nausea, vomiting, abdominal pain, CP, SOB, lightheadedness, dizziness, numbness, weakness, lower extremity swelling, heart palpitations, urinary sxs, diarrhea, constipation, melena, hematochezia, cough, or congestion. There are no other complaints, changes or physical findings pertinent to the HPI at this time.     PCP: Tevin Martinez MD    Past History   Past Medical History:  Past Medical History:   Diagnosis Date    ADD (attention deficit disorder)     Asthma     grew out of it per pt       Past Surgical History:  Past Surgical History:   Procedure Laterality Date    HX  SECTION         Family History:  Family History   Problem Relation Age of Onset    Psychiatric Disorder Mother        Social History:  Social History     Tobacco Use    Smoking status: Current Every Day Smoker     Packs/day: 0.25    Smokeless tobacco: Never Used    Tobacco comment: Black and Milds   Substance Use Topics    Alcohol use: Yes     Comment: socially     Drug use: No       Allergies:  No Known Allergies    Current Medications:  No current facility-administered medications on file prior to encounter. Current Outpatient Medications on File Prior to Encounter   Medication Sig Dispense Refill    diphenoxylate-atropine (LOMOTIL) 2.5-0.025 mg per tablet Take 1 Tab by mouth four (4) times daily as needed for Diarrhea (1 tab after each stool for max 8 per day). Max Daily Amount: 4 Tabs. Take after each stool for a maximum of 8 tablets daily 20 Tab 0    ondansetron (ZOFRAN ODT) 8 mg disintegrating tablet Take 1 Tab by mouth every eight (8) hours as needed for Nausea or Vomiting. 20 Tab 0    albuterol (PROVENTIL HFA, VENTOLIN HFA, PROAIR HFA) 90 mcg/actuation inhaler Take 2 Puffs by inhalation every four (4) hours as needed for Wheezing. 1 Inhaler 1    naproxen (NAPROSYN) 500 mg tablet Take 1 Tab by mouth two (2) times daily (with meals). 20 Tab 0    acetaminophen (TYLENOL) 325 mg tablet Take 2 Tabs by mouth every four (4) hours as needed for Pain. 20 Tab 0    promethazine (PHENERGAN) 25 mg tablet Take 1 Tab by mouth every six (6) hours as needed for Nausea. 12 Tab 0    promethazine (PHENERGAN) 25 mg tablet Take 1 Tab by mouth every six (6) hours as needed for Nausea. 12 Tab 0    amoxicillin 500 mg tab Take 500 mg by mouth three (3) times daily. 30 Tab 0    albuterol (PROVENTIL VENTOLIN) 2.5 mg /3 mL (0.083 %) nebulizer solution Take 3 mL by inhalation every four (4) hours as needed for Wheezing.  25 Each 2    albuterol (PROVENTIL HFA, VENTOLIN HFA) 90 mcg/actuation inhaler Take 2 Puffs by inhalation daily as needed. Review of Systems   Review of Systems   Constitutional: Negative. Negative for chills and fever. HENT: Negative. Negative for congestion, facial swelling, rhinorrhea, sore throat, trouble swallowing and voice change. Eyes: Negative. Respiratory: Negative. Negative for apnea, cough, chest tightness, shortness of breath and wheezing. Cardiovascular: Negative. Negative for chest pain, palpitations and leg swelling. Gastrointestinal: Negative. Negative for abdominal distention, abdominal pain, blood in stool, constipation, diarrhea, nausea and vomiting. Endocrine: Negative. Negative for cold intolerance, heat intolerance and polyuria. Genitourinary: Negative. Negative for difficulty urinating, dysuria, flank pain, frequency, hematuria and urgency. Musculoskeletal: Negative. Negative for arthralgias, back pain, myalgias, neck pain and neck stiffness. Skin: Positive for rash. Negative for color change. Neurological: Negative. Negative for dizziness, syncope, facial asymmetry, speech difficulty, weakness, light-headedness, numbness and headaches. Hematological: Negative. Does not bruise/bleed easily. Psychiatric/Behavioral: Negative. Negative for confusion and self-injury. The patient is not nervous/anxious. Physical Exam   Physical Exam  Vitals signs and nursing note reviewed. Constitutional:       General: She is not in acute distress. Appearance: She is well-developed. She is not diaphoretic. HENT:      Head: Normocephalic and atraumatic. Mouth/Throat:      Pharynx: No oropharyngeal exudate. Eyes:      Conjunctiva/sclera: Conjunctivae normal.      Pupils: Pupils are equal, round, and reactive to light. Neck:      Musculoskeletal: Normal range of motion. Cardiovascular:      Rate and Rhythm: Normal rate and regular rhythm. Heart sounds: Normal heart sounds. No murmur.  No friction rub. No gallop. Pulmonary:      Effort: Pulmonary effort is normal. No respiratory distress. Breath sounds: Normal breath sounds. No wheezing or rales. Chest:      Chest wall: No tenderness. Abdominal:      General: Bowel sounds are normal. There is no distension. Palpations: Abdomen is soft. There is no mass. Tenderness: There is no abdominal tenderness. There is no guarding or rebound. Musculoskeletal: Normal range of motion. General: No tenderness or deformity. Skin:     General: Skin is warm. Findings: Rash (Scaly macular rash scattered upper chest, mid back, scattered areas on extremities. No bullae, no coalescence, nontender without drainage. No erythema or warmth.) present. Neurological:      Mental Status: She is alert and oriented to person, place, and time. Cranial Nerves: No cranial nerve deficit. Motor: No abnormal muscle tone. Coordination: Coordination normal.      Deep Tendon Reflexes: Reflexes normal.         Diagnostic Study Results     Labs -   I have personally reviewed and interpreted all laboratory results. No results found for this or any previous visit (from the past 24 hour(s)). Radiologic Studies -   I have personally reviewed and interpreted all imaging studies and agree with radiology interpretation and report. No orders to display     CT Results  (Last 48 hours)    None        CXR Results  (Last 48 hours)    None          Medical Decision Making   I reviewed the vital signs, available nursing notes, past medical history, past surgical history, family history and social history. Vital Signs-Reviewed the patient's vital signs.   Patient Vitals for the past 24 hrs:   Temp Pulse Resp BP SpO2   12/18/20 2205 98 °F (36.7 °C) 90 16 120/60 100 %   12/18/20 2110 98 °F (36.7 °C) 90 18 121/62 100 %       Pulse Oximetry Analysis - 100% on RA    Cardiac Monitor:   Rate: 90 bpm  Rhythm: Normal Sinus Rhythm      Records Reviewed: Nursing Notes, Old Medical Records, Previous electrocardiograms, Previous Radiology Studies and Previous Laboratory Studies    Provider Notes (Medical Decision Making):   Patient presents with rash; rash and clinical picture not worrisome for scabies, measles, meningococcemia, varicella, bullous disorder, Madsen-Francisco syndrome, Toxic epidermal necrolysis, staph scalded skin syndrome, toxic shock syndrome, secondary syphilis, or disseminated herpes. Stable vitals and without constitutional symptoms. No mucosal involvement. DDx: allergic reaction, contact dermatitis, viral exanthem, staph infection. Will treat with antihistamines, steroids. No indication for EpiPen at this time. Will refer to PCP and Allergist PRN. Instructions given to patient to use over the counter benadryl 50mg every 6 hours until the rash resolves. Please also take Steroids as prescribed for next few days and Pepcid twice a day as prescribed. Call if symptoms persist or worsen. If you have shortness of breath or severe lip/tongue swelling, return to the ER immediately    ED Course:   I am the first provider for this patient's ED visit today. Initial assessment performed. I discussed presenting problems, concerns and my formulated plan for today's visit with the patient and any available family members at bedside. I encouraged them to ask questions as they arise throughout the visit. TOBACCO COUNSELING:  Upon evaluation, pt expressed that they are a current tobacco user. For approximately 10 minutes, pt has been counseled on the dangers of smoking and was encouraged to quit as soon as possible in order to decrease further risks to their health. Pt has conveyed their understanding of the risks involved should they continue to use tobacco products.     I reviewed our electronic medical record system for any past medical records that were available that may contribute to the patient's current condition, the nursing notes and vital signs from today's visit. Tang Forte MD    ED Orders Placed :  Orders Placed This Encounter    predniSONE (DELTASONE) tablet 60 mg    diphenhydrAMINE (BENADRYL) capsule 50 mg    predniSONE (STERAPRED DS) 10 mg dose pack    diphenhydrAMINE (BenadryL) 25 mg capsule    triamcinolone acetonide (KENALOG) 0.1 % ointment    mupirocin calcium (Bactroban) 2 % topical cream       ED Medications Administered:  Medications   predniSONE (DELTASONE) tablet 60 mg (60 mg Oral Given 12/18/20 2201)   diphenhydrAMINE (BENADRYL) capsule 50 mg (50 mg Oral Given 12/18/20 2201)        Progress Note:  Patient has been reassessed and reports feeling better and symptoms have improved significantly after ED treatment. Gianluca Gillis's final labs and imaging have been reviewed with her and available family and/or caregiver. They have been counseled regarding her diagnosis. She verbally conveys understanding and agreement of the signs, symptoms, diagnosis, treatment and prognosis and additionally agrees to follow up as recommended with Dr. Louis De León MD and/or specialist in 24 - 48 hours. She also agrees with the care-plan we created together and conveys that all of her questions have been answered. I have also put together some discharge instructions for her that include: 1) educational information regarding their diagnosis, 2) how to care for their diagnosis at home, as well a 3) list of reasons why they would want to return to the ED prior to their follow-up appointment should the patient's condition change or symptoms worsen. I have answered all questions to the patient's satisfaction. Strict return precautions given. She both understood and agreed with plan as discussed. Vital signs stable for discharge. Pt very appreciative of care today. Disposition:   DISCHARGE  The pt is ready for discharge. The pt's signs, symptoms, diagnosis, and discharge instructions have been discussed and pt has conveyed their understanding.  The pt is to follow up as recommended or return to ER should their symptoms worsen. Plan has been discussed and pt is in agreement. Plan:  1. Return precautions as discussed with patient and available family and/or caregiver. 2.   Discharge Medication List as of 12/18/2020  9:36 PM      START taking these medications    Details   predniSONE (STERAPRED DS) 10 mg dose pack Take as prescribed, Normal, Disp-21 Tab, R-0      diphenhydrAMINE (BenadryL) 25 mg capsule Take 2 Caps by mouth every six (6) hours as needed for Allergies for up to 10 days. , Normal, Disp-20 Cap, R-0      triamcinolone acetonide (KENALOG) 0.1 % ointment Apply  to affected area two (2) times a day. use thin layer, Normal, Disp-30 g, R-0      mupirocin calcium (Bactroban) 2 % topical cream Apply  to affected area two (2) times a day., Normal, Disp-15 g, R-0         CONTINUE these medications which have NOT CHANGED    Details   diphenoxylate-atropine (LOMOTIL) 2.5-0.025 mg per tablet Take 1 Tab by mouth four (4) times daily as needed for Diarrhea (1 tab after each stool for max 8 per day). Max Daily Amount: 4 Tabs. Take after each stool for a maximum of 8 tablets daily, Normal, Disp-20 Tab, R-0      ondansetron (ZOFRAN ODT) 8 mg disintegrating tablet Take 1 Tab by mouth every eight (8) hours as needed for Nausea or Vomiting., Normal, Disp-20 Tab, R-0      !! albuterol (PROVENTIL HFA, VENTOLIN HFA, PROAIR HFA) 90 mcg/actuation inhaler Take 2 Puffs by inhalation every four (4) hours as needed for Wheezing., Normal, Disp-1 Inhaler, R-1      naproxen (NAPROSYN) 500 mg tablet Take 1 Tab by mouth two (2) times daily (with meals). , Normal, Disp-20 Tab, R-0      acetaminophen (TYLENOL) 325 mg tablet Take 2 Tabs by mouth every four (4) hours as needed for Pain., Normal, Disp-20 Tab, R-0      !! promethazine (PHENERGAN) 25 mg tablet Take 1 Tab by mouth every six (6) hours as needed for Nausea., Normal, Disp-12 Tab, R-0      !! promethazine (PHENERGAN) 25 mg tablet Take 1 Tab by mouth every six (6) hours as needed for Nausea., Normal, Disp-12 Tab, R-0      amoxicillin 500 mg tab Take 500 mg by mouth three (3) times daily. , Print, Disp-30 Tab, R-0      albuterol (PROVENTIL VENTOLIN) 2.5 mg /3 mL (0.083 %) nebulizer solution Take 3 mL by inhalation every four (4) hours as needed for Wheezing., Print, Disp-25 Each, R-2      !! albuterol (PROVENTIL HFA, VENTOLIN HFA) 90 mcg/actuation inhaler Take 2 Puffs by inhalation daily as needed.  , Historical Med       !! - Potential duplicate medications found. Please discuss with provider. 3.   Follow-up Information     Follow up With Specialties Details Why Contact Info    Adalgisa Bejarano MD [place] [address]  [phone]      [facility] EMERGENCY DEPT Emergency Medicine  As needed, If symptoms worsen [address] [facility] EMERGENCY DEPT Emergency Medicine   [facility]  [phone]    Adalgisa Bejarano MD Family Medicine  As needed, If symptoms worsen [address] [facility]  [phone]            Instructed to return to ED if worse  Diagnosis   Clinical Impression:  1. Eczema, unspecified type    2. Rash and other nonspecific skin eruption    3. Pruritic rash    4. Tobacco abuse        Attestation:  Rupinder Green MD, am the attending of record for this patient. I personally performed the services described in this documentation on this date, 12/18/2020 for patient, Patricia De La Cruz. I have reviewed the chart and verified that the record is accurate and complete.

## 2021-04-01 ENCOUNTER — HOSPITAL ENCOUNTER (EMERGENCY)
Age: 26
Discharge: HOME | End: 2021-04-01
Attending: EMERGENCY MEDICINE
Payer: MEDICAID

## 2021-04-01 VITALS
HEART RATE: 80 BPM | SYSTOLIC BLOOD PRESSURE: 120 MMHG | HEIGHT: 62 IN | RESPIRATION RATE: 16 BRPM | BODY MASS INDEX: 23.92 KG/M2 | WEIGHT: 130 LBS | DIASTOLIC BLOOD PRESSURE: 47 MMHG | TEMPERATURE: 98.4 F | OXYGEN SATURATION: 99 %

## 2021-04-01 DIAGNOSIS — A59.9 TRICHOMONAS INFECTION: ICD-10-CM

## 2021-04-01 DIAGNOSIS — N30.00 ACUTE CYSTITIS WITHOUT HEMATURIA: Primary | ICD-10-CM

## 2021-04-01 LAB
APPEARANCE UR: ABNORMAL
BACTERIA URNS QL MICRO: NEGATIVE /HPF
BILIRUB UR QL: NEGATIVE
COLOR UR: ABNORMAL
EPITH CASTS URNS QL MICRO: ABNORMAL /LPF
GLUCOSE UR STRIP.AUTO-MCNC: NEGATIVE MG/DL
HCG UR QL: NEGATIVE
HGB UR QL STRIP: ABNORMAL
KETONES UR QL STRIP.AUTO: NEGATIVE MG/DL
LEUKOCYTE ESTERASE UR QL STRIP.AUTO: ABNORMAL
NITRITE UR QL STRIP.AUTO: NEGATIVE
PH UR STRIP: 6 [PH] (ref 5–8)
PROT UR STRIP-MCNC: NEGATIVE MG/DL
RBC #/AREA URNS HPF: ABNORMAL /HPF (ref 0–5)
SP GR UR REFRACTOMETRY: 1.02 (ref 1–1.03)
TRICHOMONAS UR QL MICRO: PRESENT
UA: UC IF INDICATED,UAUC: ABNORMAL
UROBILINOGEN UR QL STRIP.AUTO: 0.2 EU/DL (ref 0.2–1)
WBC URNS QL MICRO: ABNORMAL /HPF (ref 0–4)

## 2021-04-01 PROCEDURE — 99283 EMERGENCY DEPT VISIT LOW MDM: CPT

## 2021-04-01 PROCEDURE — 96372 THER/PROPH/DIAG INJ SC/IM: CPT

## 2021-04-01 PROCEDURE — 81025 URINE PREGNANCY TEST: CPT

## 2021-04-01 PROCEDURE — 74011250636 HC RX REV CODE- 250/636: Performed by: PHYSICIAN ASSISTANT

## 2021-04-01 PROCEDURE — 81001 URINALYSIS AUTO W/SCOPE: CPT

## 2021-04-01 PROCEDURE — 87086 URINE CULTURE/COLONY COUNT: CPT

## 2021-04-01 RX ORDER — METRONIDAZOLE 500 MG/1
500 TABLET ORAL 2 TIMES DAILY
Qty: 14 TAB | Refills: 0 | Status: SHIPPED | OUTPATIENT
Start: 2021-04-01 | End: 2021-04-08

## 2021-04-01 RX ORDER — IBUPROFEN 800 MG/1
800 TABLET ORAL
Qty: 20 TAB | Refills: 0 | Status: SHIPPED | OUTPATIENT
Start: 2021-04-01 | End: 2021-04-08

## 2021-04-01 RX ORDER — KETOROLAC TROMETHAMINE 30 MG/ML
30 INJECTION, SOLUTION INTRAMUSCULAR; INTRAVENOUS ONCE
Status: COMPLETED | OUTPATIENT
Start: 2021-04-01 | End: 2021-04-01

## 2021-04-01 RX ORDER — CEPHALEXIN 500 MG/1
500 CAPSULE ORAL 2 TIMES DAILY
Qty: 14 CAP | Refills: 0 | Status: SHIPPED | OUTPATIENT
Start: 2021-04-01 | End: 2021-04-08

## 2021-04-01 RX ADMIN — KETOROLAC TROMETHAMINE 30 MG: 30 INJECTION, SOLUTION INTRAMUSCULAR; INTRAVENOUS at 10:18

## 2021-04-01 NOTE — ED TRIAGE NOTES
Middle upper back pain x few days, denies injury or heavy lifting, reports a history of back pain in a different location than the previous back pain.

## 2021-04-01 NOTE — ED PROVIDER NOTES
EMERGENCY DEPARTMENT HISTORY AND PHYSICAL EXAM    Date: 2021  Patient Name: Alex Garcia    History of Presenting Illness     Chief Complaint   Patient presents with    Back Pain         History Provided By: Patient    HPI: Alex Garcia is a 22 y.o. female with a PMH of ADD and asthma who presents with right middle back pain x2 to 3 days. Patient denies any heavy lifting, pushing, pulling, injury or trauma, no paresthesias. Patient states she has tried stretching with no relief. Patient rates pain 8 out of 10. Patient states pain has progressed since it initially started and states that when she first noticed it she thought maybe she had slept wrong but there has been no relief since then. PCP: Will James MD    Current Outpatient Medications   Medication Sig Dispense Refill    metroNIDAZOLE (FlagyL) 500 mg tablet Take 1 Tab by mouth two (2) times a day for 7 days. 14 Tab 0    cephALEXin (Keflex) 500 mg capsule Take 1 Cap by mouth two (2) times a day for 7 days. 14 Cap 0    ibuprofen (MOTRIN) 800 mg tablet Take 1 Tab by mouth every eight (8) hours as needed for Pain for up to 7 days. 20 Tab 0       Past History     Past Medical History:  Past Medical History:   Diagnosis Date    ADD (attention deficit disorder)     Asthma     grew out of it per pt       Past Surgical History:  Past Surgical History:   Procedure Laterality Date    HX  SECTION         Family History:  Family History   Problem Relation Age of Onset    Psychiatric Disorder Mother        Social History:  Social History     Tobacco Use    Smoking status: Current Every Day Smoker     Packs/day: 0.25    Smokeless tobacco: Never Used    Tobacco comment: Black and Milds   Substance Use Topics    Alcohol use: Yes     Comment: socially     Drug use: No       Allergies:  No Known Allergies      Review of Systems   Review of Systems   Constitutional: Negative for chills and fever.    Respiratory: Negative for cough and shortness of breath. Gastrointestinal: Negative for nausea and vomiting. Genitourinary: Negative for dysuria and frequency. Musculoskeletal: Positive for back pain. Allergic/Immunologic: Negative for immunocompromised state. Neurological: Negative for speech difficulty, weakness and numbness. Psychiatric/Behavioral: Negative for self-injury. All other systems reviewed and are negative. Physical Exam     Vitals:    04/01/21 0922   BP: (!) 120/47   Pulse: 80   Resp: 16   Temp: 98.4 °F (36.9 °C)   SpO2: 99%   Weight: 59 kg (130 lb)   Height: 5' 2\" (1.575 m)     Physical Exam  Vitals signs and nursing note reviewed. Constitutional:       General: She is not in acute distress. Appearance: She is well-developed. HENT:      Head: Normocephalic and atraumatic. Eyes:      Conjunctiva/sclera: Conjunctivae normal.   Cardiovascular:      Rate and Rhythm: Normal rate and regular rhythm. Heart sounds: Normal heart sounds. Pulmonary:      Effort: Pulmonary effort is normal. No respiratory distress. Breath sounds: Normal breath sounds. No wheezing or rales. Musculoskeletal:      Right shoulder: Normal.      Thoracic back: She exhibits tenderness ( To the right lateral thoracic region) and pain. She exhibits normal range of motion, no bony tenderness, no swelling, no edema, no deformity and normal pulse. Back:    Skin:     General: Skin is warm and dry. Neurological:      Mental Status: She is alert and oriented to person, place, and time. Psychiatric:         Behavior: Behavior normal.         Thought Content:  Thought content normal.         Judgment: Judgment normal.           Diagnostic Study Results     Labs -     Recent Results (from the past 12 hour(s))   HCG URINE, QL. - POC    Collection Time: 04/01/21 10:14 AM   Result Value Ref Range    Pregnancy test,urine (POC) Negative NEG     URINALYSIS W/ REFLEX CULTURE    Collection Time: 04/01/21 10:15 AM    Specimen: Urine   Result Value Ref Range    Color YELLOW/STRAW      Appearance CLOUDY (A) CLEAR      Specific gravity 1.025 1.003 - 1.030      pH (UA) 6.0 5.0 - 8.0      Protein Negative NEG mg/dL    Glucose Negative NEG mg/dL    Ketone Negative NEG mg/dL    Bilirubin Negative NEG      Blood MODERATE (A) NEG      Urobilinogen 0.2 0.2 - 1.0 EU/dL    Nitrites Negative NEG      Leukocyte Esterase SMALL (A) NEG      WBC 10-20 0 - 4 /hpf    RBC 0-5 0 - 5 /hpf    Epithelial cells MODERATE (A) FEW /lpf    Bacteria Negative NEG /hpf    UA:UC IF INDICATED URINE CULTURE ORDERED (A) CNI      Trichomonas PRESENT (A) NEG         Radiologic Studies -   No orders to display     CT Results  (Last 48 hours)    None        CXR Results  (Last 48 hours)    None            Medical Decision Making   I am the first provider for this patient. I reviewed the vital signs, available nursing notes, past medical history, past surgical history, family history and social history. Vital Signs-Reviewed the patient's vital signs. Records Reviewed: Nursing Notes and Old Medical Records    Provider Notes (Medical Decision Making):   Patient presents with right mid back pain x2 to 3 days. DDx: Strain, sprain, muscle spasm, UTI. Will release check a UA but likely muscle strain so we will treat symptomatically    Disposition:  Discharge    DISCHARGE NOTE:   10:54AM        Care plan outlined and precautions discussed. Patient has no new complaints, changes, or physical findings. Results of labs were reviewed with the patient. All medications were reviewed with the patient; will d/c home. All of pt's questions and concerns were addressed. Patient was instructed and agrees to follow up with PCP prn, as well as to return to the ED upon further deterioration. Patient is ready to go home.     Follow-up Information     Follow up With Specialties Details Why Contact Info    Nicolás Velazquez MD Family Medicine In 1 week As needed [address] [facility]  [phone] Discharge Medication List as of 4/1/2021 10:54 AM      START taking these medications    Details   metroNIDAZOLE (FlagyL) 500 mg tablet Take 1 Tab by mouth two (2) times a day for 7 days. , Normal, Disp-14 Tab, R-0      cephALEXin (Keflex) 500 mg capsule Take 1 Cap by mouth two (2) times a day for 7 days. , Normal, Disp-14 Cap, R-0      ibuprofen (MOTRIN) 800 mg tablet Take 1 Tab by mouth every eight (8) hours as needed for Pain for up to 7 days. , Normal, Disp-20 Tab, R-0             Procedures:  Procedures    Please note that this dictation was completed with Dragon, computer voice recognition software. Quite often unanticipated grammatical, syntax, homophones, and other interpretive errors are inadvertently transcribed by the computer software. Please disregard these errors. Additionally, please excuse any errors that have escaped final proofreading. Diagnosis     Clinical Impression:   1. Acute cystitis without hematuria    2.  Trichomonas infection

## 2021-04-02 LAB
BACTERIA SPEC CULT: NORMAL
SERVICE CMNT-IMP: NORMAL

## 2021-05-25 ENCOUNTER — HOSPITAL ENCOUNTER (EMERGENCY)
Age: 26
Discharge: HOME | End: 2021-05-25
Attending: EMERGENCY MEDICINE
Payer: MEDICAID

## 2021-05-25 VITALS
DIASTOLIC BLOOD PRESSURE: 68 MMHG | RESPIRATION RATE: 18 BRPM | WEIGHT: 130 LBS | BODY MASS INDEX: 23.92 KG/M2 | HEIGHT: 62 IN | OXYGEN SATURATION: 100 % | SYSTOLIC BLOOD PRESSURE: 117 MMHG | HEART RATE: 73 BPM | TEMPERATURE: 97.6 F

## 2021-05-25 DIAGNOSIS — S16.1XXA STRAIN OF NECK MUSCLE, INITIAL ENCOUNTER: Primary | ICD-10-CM
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
## 2021-05-25 DIAGNOSIS — S60.222A CONTUSION OF LEFT HAND, INITIAL ENCOUNTER: ICD-10-CM

## 2021-05-25 PROCEDURE — 99282 EMERGENCY DEPT VISIT SF MDM: CPT

## 2021-05-25 RX ORDER — IBUPROFEN 600 MG/1
600 TABLET ORAL
Qty: 20 TABLET | Refills: 0 | Status: SHIPPED | OUTPATIENT
Start: 2021-05-25 | End: 2022-07-08 | Stop reason: SDUPTHER

## 2021-05-25 RX ORDER — METHOCARBAMOL 750 MG/1
750 TABLET, FILM COATED ORAL
Qty: 15 TABLET | Refills: 0 | OUTPATIENT
Start: 2021-05-25 | End: 2022-07-08
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
## 2021-05-25 NOTE — Clinical Note
[facility] EMERGENCY DEPT 
[address] [facility] [place] [address] 
[phone] Work/School Note Date: 5/25/2021 To Whom It May concern: 
 
Ruben Guaman was seen and treated today in the emergency room by the following provider(s): 
Attending Provider: Jazzy Swansondana Otoole is excused from work/school on 5/25/2021 through 5/28/2021. She is medically clear to return to work/school on 5/29/2021. Sincerely, Keny Ackerman, DO
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
## 2021-07-14 ENCOUNTER — HOSPITAL ENCOUNTER (EMERGENCY)
Age: 26
Discharge: HOME | End: 2021-07-14
Attending: EMERGENCY MEDICINE | Admitting: EMERGENCY MEDICINE
Payer: MEDICAID

## 2021-07-14 VITALS
DIASTOLIC BLOOD PRESSURE: 60 MMHG | RESPIRATION RATE: 18 BRPM | WEIGHT: 130 LBS | OXYGEN SATURATION: 100 % | SYSTOLIC BLOOD PRESSURE: 115 MMHG | HEART RATE: 82 BPM | TEMPERATURE: 97.7 F | HEIGHT: 64 IN | BODY MASS INDEX: 22.2 KG/M2

## 2021-07-14 DIAGNOSIS — Z72.0 TOBACCO ABUSE: ICD-10-CM

## 2021-07-14 DIAGNOSIS — L30.9 ECZEMA, UNSPECIFIED TYPE: ICD-10-CM

## 2021-07-14 DIAGNOSIS — I88.9 CERVICAL LYMPHADENITIS: Primary | ICD-10-CM

## 2021-07-14 DIAGNOSIS — Z71.6 ENCOUNTER FOR SMOKING CESSATION COUNSELING: ICD-10-CM

## 2021-07-14 PROCEDURE — 74011636637 HC RX REV CODE- 636/637: Performed by: EMERGENCY MEDICINE

## 2021-07-14 PROCEDURE — 74011250637 HC RX REV CODE- 250/637: Performed by: EMERGENCY MEDICINE

## 2021-07-14 PROCEDURE — 99283 EMERGENCY DEPT VISIT LOW MDM: CPT

## 2021-07-14 RX ORDER — NAPROXEN 500 MG/1
500 TABLET ORAL 2 TIMES DAILY WITH MEALS
Qty: 20 TABLET | Refills: 0 | OUTPATIENT
Start: 2021-07-14 | End: 2022-07-08

## 2021-07-14 RX ORDER — CEPHALEXIN 500 MG/1
500 CAPSULE ORAL 4 TIMES DAILY
Qty: 28 CAPSULE | Refills: 0 | Status: SHIPPED | OUTPATIENT
Start: 2021-07-14 | End: 2021-07-21

## 2021-07-14 RX ORDER — NAPROXEN 250 MG/1
500 TABLET ORAL ONCE
Status: COMPLETED | OUTPATIENT
Start: 2021-07-14 | End: 2021-07-14

## 2021-07-14 RX ORDER — PREDNISONE 10 MG/1
TABLET ORAL
Qty: 21 TABLET | Refills: 0 | OUTPATIENT
Start: 2021-07-14 | End: 2022-07-08

## 2021-07-14 RX ORDER — CEPHALEXIN 500 MG/1
500 CAPSULE ORAL
Status: COMPLETED | OUTPATIENT
Start: 2021-07-14 | End: 2021-07-14

## 2021-07-14 RX ORDER — PREDNISONE 20 MG/1
60 TABLET ORAL ONCE
Status: COMPLETED | OUTPATIENT
Start: 2021-07-14 | End: 2021-07-14

## 2021-07-14 RX ADMIN — NAPROXEN 500 MG: 250 TABLET ORAL at 06:55

## 2021-07-14 RX ADMIN — CEPHALEXIN 500 MG: 500 CAPSULE ORAL at 06:32

## 2021-07-14 RX ADMIN — PREDNISONE 60 MG: 20 TABLET ORAL at 06:32

## 2021-07-14 NOTE — ED NOTES
Pt presents to the ED with c/o swollen glands in her neck x 3 weeks. Pt stated at first they weren't painful but then they've gotten bigger and painful. Denies discharge. Denies fevers, body aches or chills. Reports an ezema flare up and is unsure if its related. Reports being up to date on her immunizations. Reports taking ibuprofen 800 mg and warm compresses with minor relief. Pt is alert, oriented and appropriate. Ambulatory on arrival.       Emergency Department Nursing Plan of Care       The Nursing Plan of Care is developed from the Nursing assessment and Emergency Department Attending provider initial evaluation. The plan of care may be reviewed in the ED Provider note.     The Plan of Care was developed with the following considerations:   Patient / Family readiness to learn indicated by:verbalized understanding  Persons(s) to be included in education: patient  Barriers to Learning/Limitations:No    Signed     Ion Hickman    7/14/2021   6:27 AM

## 2021-07-14 NOTE — ED PROVIDER NOTES
EMERGENCY DEPARTMENT HISTORY AND PHYSICAL EXAM      Please note that this dictation was completed with the assistance of \"Dragon\", the computer voice recognition software. Quite often unanticipated grammatical, syntax, homophones, and other interpretive errors are inadvertently transcribed by the computer software. Please disregard these errors and any errors that have escaped final proofreading. Thank you. Patient Name: Romi Solomon  : 1995  MRN: [id number]  History of Presenting Illness     Chief Complaint   Patient presents with    Neck Pain    Skin Problem     History Provided By: Patient    HPI: Romi Solomon, 22 y.o. female with past medical history as documented below presents to the ED with c/o of three weeks of intermittent right neck pain and swollen glands. Pt states glands have increased in size. Pt states initially not painful but now currently 5/10 throbbing pain. She took Motrin and used warm compresses with some relief. Denies fevers or chills. No dysphagia, voice changes or SOB. Also states having an eczema flare on her neck and upper chest. Denies cat scratches, night sweats, weight loss. Pt denies any other exacerbating or ameliorating factors. Additionally, pt specifically denies any recent fever, chills, headache, nausea, vomiting, abdominal pain, CP, SOB, lightheadedness, dizziness, numbness, weakness, lower extremity swelling, heart palpitations, urinary sxs, diarrhea, constipation, melena, hematochezia, cough, or congestion. There are no other complaints, changes or physical findings pertinent to the HPI at this time.     PCP: Marlo Fernandez MD    Past History   Past Medical History:  Past Medical History:   Diagnosis Date    ADD (attention deficit disorder)     Asthma     grew out of it per pt       Past Surgical History:  Past Surgical History:   Procedure Laterality Date    HX  SECTION         Family History:  Family History   Problem Relation Age of Onset    Psychiatric Disorder Mother        Social History:  Social History     Tobacco Use    Smoking status: Current Every Day Smoker     Packs/day: 0.25    Smokeless tobacco: Never Used    Tobacco comment: Black and Milds   Substance Use Topics    Alcohol use: Yes     Comment: socially     Drug use: No       Allergies:  No Known Allergies    Current Medications:  No current facility-administered medications on file prior to encounter. Current Outpatient Medications on File Prior to Encounter   Medication Sig Dispense Refill    ibuprofen (MOTRIN) 600 mg tablet Take 1 Tablet by mouth every six (6) hours as needed for Pain. 20 Tablet 0    methocarbamoL (Robaxin-750) 750 mg tablet Take 1 Tablet by mouth three (3) times daily as needed for Muscle Spasm(s). 15 Tablet 0     Review of Systems   Review of Systems   Constitutional: Negative. Negative for chills and fever. HENT: Negative. Negative for congestion and sore throat. Eyes: Negative. Respiratory: Negative. Negative for cough, chest tightness, shortness of breath and wheezing. Cardiovascular: Negative. Negative for chest pain, palpitations and leg swelling. Gastrointestinal: Negative. Negative for abdominal distention, abdominal pain, blood in stool, constipation, diarrhea, nausea and vomiting. Endocrine: Negative. Genitourinary: Negative. Negative for dysuria, flank pain, frequency, hematuria and urgency. Musculoskeletal: Negative. Negative for arthralgias, back pain and myalgias. Skin: Positive for rash. Negative for color change. Neurological: Negative. Negative for dizziness, syncope, speech difficulty, weakness, light-headedness, numbness and headaches. Hematological: Positive for adenopathy. Psychiatric/Behavioral: Negative. Negative for confusion and self-injury. The patient is not nervous/anxious. All other systems reviewed and are negative. Physical Exam   Physical Exam  Vitals and nursing note reviewed. Constitutional:       General: She is not in acute distress. Appearance: She is well-developed. She is not diaphoretic. HENT:      Head: Normocephalic and atraumatic. Mouth/Throat:      Pharynx: No oropharyngeal exudate. Eyes:      Conjunctiva/sclera: Conjunctivae normal.      Pupils: Pupils are equal, round, and reactive to light. Neck:      Comments: TTP right paracervical neck with <2cm TTP lymph node, no fluctuance, FROM neck, no bruit  Cardiovascular:      Rate and Rhythm: Normal rate and regular rhythm. Heart sounds: Normal heart sounds. No murmur heard. No friction rub. No gallop. Pulmonary:      Effort: Pulmonary effort is normal. No respiratory distress. Breath sounds: Normal breath sounds. No wheezing or rales. Chest:      Chest wall: No tenderness. Abdominal:      General: Bowel sounds are normal. There is no distension. Palpations: Abdomen is soft. There is no mass. Tenderness: There is no abdominal tenderness. There is no guarding or rebound. Musculoskeletal:         General: No tenderness or deformity. Normal range of motion. Cervical back: Normal range of motion. Skin:     General: Skin is warm. Findings: Rash (scaly rash noted to anterior neck and upper chest) present. Neurological:      Mental Status: She is alert and oriented to person, place, and time. Cranial Nerves: No cranial nerve deficit. Motor: No abnormal muscle tone. Coordination: Coordination normal.         Diagnostic Study Results     Labs -   I have personally reviewed and interpreted all available laboratory results. No results found for this or any previous visit (from the past 24 hour(s)). Radiologic Studies -   I have personally reviewed and interpreted all available imaging studies and agree with radiology interpretation and report.    No orders to display     CT Results  (Last 48 hours)    None        CXR Results  (Last 48 hours)    None Medical Decision Making   I reviewed the vital signs, available nursing notes, past medical history, past surgical history, family history and social history. Vital Signs-Reviewed the patient's vital signs. Patient Vitals for the past 24 hrs:   Temp Pulse Resp BP SpO2   07/14/21 0604 97.7 °F (36.5 °C) 82 18 115/60 100 %     Pulse Oximetry Analysis - 100% on RA    Cardiac Monitor:   Rate: 82 bpm  The cardiac monitor revealed the following rhythm as interpreted by me: Normal Sinus Rhythm      Records Reviewed: Nursing Notes, Old Medical Records, Previous electrocardiograms, Previous Radiology Studies and Previous Laboratory Studies    Provider Notes (Medical Decision Making):   Patient presents with uncomplicated cervical lymphadenitis and eczema-type rash; no fluctuance, no fevers or systemic symptoms, no hx of cat scratch or b-symptoms, Plan for steroid course, PO abx, warm compresses and ENT referral. No imaging currently indicated. Rash and clinical picture not worrisome for scabies, measles, meningococcemia, varicella, bullous disorder, Madsen-Francisco syndrome, Toxic epidermal necrolysis, staph scalded skin syndrome, toxic shock syndrome, secondary syphilis, or disseminated herpes. Stable vitals and without constitutional symptoms. No mucosal involvement. DDx: allergic reaction, contact dermatitis, viral exanthem, staph infection. Will treat with antihistamines, steroids. No indication for EpiPen at this time. Will refer to PCP and Allergist PRN. Instructions given to patient to use over the counter benadryl 50mg every 6 hours until the rash resolves. Please also take Steroids as prescribed for next few days and Pepcid twice a day as prescribed. Call if symptoms persist or worsen. If you have shortness of breath or severe lip/tongue swelling, return to the ER immediately. ED Course:   I am the first provider for this patient's ED visit today. Initial assessment performed.  I discussed presenting problems, concerns and my formulated plan for today's visit with the patient and any available family members at bedside. I encouraged them to ask questions as they arise throughout the visit. Social History     Tobacco Use    Smoking status: Current Every Day Smoker     Packs/day: 0.25    Smokeless tobacco: Never Used    Tobacco comment: Black and Milds   Substance Use Topics    Alcohol use: Yes     Comment: socially     Drug use: No     TOBACCO COUNSELING:  Upon evaluation, pt expressed that they are a current tobacco user. For approximately 3-5 mins, pt has been counseled on the dangers of smoking and was encouraged to quit as soon as possible in order to decrease further risks to their health. Pt has conveyed their understanding of the risks involved should they continue to use tobacco products. I reviewed our electronic medical record system for any past medical records that were available that may contribute to the patient's current condition, the nursing notes and vital signs from today's visit. ED Orders Placed :  Orders Placed This Encounter    cephALEXin (KEFLEX) capsule 500 mg    predniSONE (DELTASONE) tablet 60 mg    cephALEXin (Keflex) 500 mg capsule    predniSONE (STERAPRED DS) 10 mg dose pack    naproxen (NAPROSYN) 500 mg tablet    naproxen (NAPROSYN) tablet 500 mg       ED Medications Administered:  Medications   cephALEXin (KEFLEX) capsule 500 mg (500 mg Oral Given 7/14/21 3925)   predniSONE (DELTASONE) tablet 60 mg (60 mg Oral Given 7/14/21 0818)   naproxen (NAPROSYN) tablet 500 mg (500 mg Oral Given 7/14/21 5841)        Progress Note:  Patient has been reassessed and reports feeling better and symptoms have improved significantly after ED treatment. Raeford Osgood Noel's final labs and imaging have been reviewed with her and available family and/or caregiver. They have been counseled regarding her diagnosis.  She verbally conveys understanding and agreement of the signs, symptoms, diagnosis, treatment and prognosis and additionally agrees to follow up as recommended with Dr. Aubrie Zepeda MD and/or specialist in 24 - 48 hours. She also agrees with the care-plan we created together and conveys that all of her questions have been answered. I have also put together a packet of discharge instructions for her that include: 1) educational information regarding their diagnosis, 2) how to care for their diagnosis at home, as well a 3) list of reasons why they would want to return to the ED prior to their follow-up appointment should the patient's condition change or symptoms worsen. I have answered all questions to the patient's satisfaction. Strict return precautions given. She both understood and agreed with plan as discussed. Vital signs stable for discharge. Disposition:   DISCHARGE  The pt is ready for discharge. The pt's signs, symptoms, diagnosis, and discharge instructions have been discussed and pt has conveyed their understanding. The pt is to follow up as recommended or return to ER should their symptoms worsen. Plan has been discussed and pt is in agreement.'    Plan:  1. Return precautions as discussed with patient and available family and/or caregiver. 2.   Discharge Medication List as of 7/14/2021  6:53 AM      START taking these medications    Details   cephALEXin (Keflex) 500 mg capsule Take 1 Capsule by mouth four (4) times daily for 7 days. , Normal, Disp-28 Capsule, R-0      predniSONE (STERAPRED DS) 10 mg dose pack Take as prescribed, Normal, Disp-21 Tablet, R-0      naproxen (NAPROSYN) 500 mg tablet Take 1 Tablet by mouth two (2) times daily (with meals). , Normal, Disp-20 Tablet, R-0         CONTINUE these medications which have NOT CHANGED    Details   ibuprofen (MOTRIN) 600 mg tablet Take 1 Tablet by mouth every six (6) hours as needed for Pain., Normal, Disp-20 Tablet, R-0      methocarbamoL (Robaxin-750) 750 mg tablet Take 1 Tablet by mouth three (3) times daily as needed for Muscle Spasm(s). , Normal, Disp-15 Tablet, R-0           3. Follow-up Information     Follow up With Specialties Details Why [address]    [facility] EMERGENCY DEPT Emergency Medicine  As needed, If symptoms worsen [address]    [facility] EMERGENCY DEPT Emergency Medicine  As needed, If symptoms worsen [address] [facility]    Edith Ham MD Family Medicine  As needed, If symptoms worsen [address] [facility]  [phone]      [address] [facility] DEPT OF OTOLARYNGOLOGY   As needed, If symptoms worsen [address]  [phone]    [facility]  As needed, If symptoms worsen [address]  [phone]          Instructed to return to ED if worse  Diagnosis   Clinical Impression:  1. Cervical lymphadenitis    2. Tobacco abuse    3. Encounter for smoking cessation counseling    4. Eczema, unspecified type        Attestation:  Alex Mercedes MD, am the attending of record for this patient. I personally performed the services described in this documentation on this date, 7/14/2021 for patient, Cheryle Ley. I have reviewed the chart and verified that the record is accurate and complete.

## 2021-07-14 NOTE — DISCHARGE INSTRUCTIONS
It was a pleasure taking care of you in our Emergency Department today. We know that when you come to [address] [place] [address], you are entrusting us with your health, comfort, and safety. Our physicians and nurses honor that trust, and truly appreciate the opportunity to care for you and your loved ones. We also value your feedback. If you receive a survey about your Emergency Department experience today, please fill it out. We care about our patients' feedback, and we listen to what you have to say. Thank you!

## 2021-07-14 NOTE — LETTER
[facility] EMERGENCY DEPT  [address] [facility] [address] [phone]    Work/School Note    Date: 7/14/2021    To Whom It May concern:    James Littlejohn was seen and treated today in the emergency room by the following provider(s):  Attending Provider: Jean Carlisle MD.      James Littlejohn may return to work on 7/16/21.     Sincerely,          Naty Epstein MD

## 2021-07-14 NOTE — ED TRIAGE NOTES
Bumps to neck x 2 weeks. Report the past 3 nights pain has increased to bumps. Reports three bumps on neck that hurt at night time.  Ibuprofen taken a littl over 1 hour ago

## 2021-11-13 ENCOUNTER — HOSPITAL ENCOUNTER (EMERGENCY)
Age: 26
Discharge: HOME | End: 2021-11-13
Attending: EMERGENCY MEDICINE
Payer: MEDICAID

## 2021-11-13 VITALS
SYSTOLIC BLOOD PRESSURE: 108 MMHG | RESPIRATION RATE: 18 BRPM | OXYGEN SATURATION: 100 % | TEMPERATURE: 98.9 F | BODY MASS INDEX: 23.92 KG/M2 | HEIGHT: 62 IN | HEART RATE: 69 BPM | DIASTOLIC BLOOD PRESSURE: 54 MMHG | WEIGHT: 130 LBS

## 2021-11-13 DIAGNOSIS — Z20.822 CLOSE EXPOSURE TO COVID-19 VIRUS: Primary | ICD-10-CM

## 2021-11-13 PROCEDURE — 99282 EMERGENCY DEPT VISIT SF MDM: CPT

## 2021-11-13 PROCEDURE — U0003 INFECTIOUS AGENT DETECTION BY NUCLEIC ACID (DNA OR RNA); SEVERE ACUTE RESPIRATORY SYNDROME CORONAVIRUS 2 (SARS-COV-2) (CORONAVIRUS DISEASE [COVID-19]), AMPLIFIED PROBE TECHNIQUE, MAKING USE OF HIGH THROUGHPUT TECHNOLOGIES AS DESCRIBED BY CMS-2020-01-R: HCPCS

## 2021-11-13 NOTE — DISCHARGE INSTRUCTIONS
Prevention steps for patients with confirmed or suspected COVID-19 who do not need to be hospitalized    Timing for Self-Isolation    If you have been exposed but do not have symptoms:  If you suspect you have been exposed to someone with COVID-19 and don't have any symptoms, you should self-isolate at home for 14 days from the time of exposure. If you have symptoms whether or not you have been tested: If you have symptoms suggestive of COVID-19, such as fever or cough, regardless of whether you have been tested, you should self-isolate at home until 72 hours (3 days) after your symptoms have completely resolved AND 7 days after your symptoms first started, whichever is later. How to Properly Self-Isolate Due to COVID-19    Stay home except to get medical care  People who are mildly ill with COVID-19 are able to isolate at home during their illness. You should restrict activities outside your home, except for getting medical care. Do not go to work, school, or public areas. Avoid using public transportation, ride-sharing, or taxis. If you or a loved one must go out, please make sure to wash hands properly immediately on returning home. Disinfect touched surfaces. Do not touch your face. Separate yourself from other people and animals in your home  People: As much as possible, you should stay in a specific room and away from other people in your home. Also, you should use a separate bathroom, if available. Animals: You should restrict contact with pets and other animals while you are sick with COVID-19, just like you would around other people. Although there have not been reports of pets or other animals becoming sick with COVID-19, it is still recommended that people sick with COVID-19 limit contact with animals until more information is known about the virus. When possible, have another member of your household care for your animals while you are sick.  If you are sick with COVID-19, avoid contact with your pet, including petting, snuggling, being kissed or licked, and sharing food. If you must care for your pet or be around animals while you are sick, wash your hands before and after you interact with pets and wear a facemask. Call ahead before visiting your doctor  If you have a medical appointment, call the healthcare provider and tell them that you have or may have COVID-19. This will help the healthcare providers office take steps to keep other people from getting infected or exposed. Wear a facemask  You should wear a facemask when you are around other people (e.g., sharing a room or vehicle) or pets and before you enter a healthcare providers office. If you are not able to wear a facemask (for example, because it causes trouble breathing), then people who live with you should not stay in the same room with you, or they should wear a facemask if they enter your room. Cover your coughs and sneezes  Cover your mouth and nose with a tissue when you cough or sneeze. Throw used tissues in a lined trash can. Immediately wash your hands with soap and water for at least 20 seconds or, if soap and water are not available, clean your hands with an alcohol-based hand  that contains at least 60% alcohol. Clean your hands often  Wash your hands often with soap and water for at least 20 seconds, especially after blowing your nose, coughing, or sneezing; going to the bathroom; and before eating or preparing food. If soap and water are not readily available, use an alcohol-based hand  with at least 60% alcohol, covering all surfaces of your hands and rubbing them together until they feel dry. Soap and water are the best option if hands are visibly dirty. Avoid touching your eyes, nose, and mouth with unwashed hands. Avoid sharing personal household items  You should not share dishes, drinking glasses, cups, eating utensils, towels, or bedding with other people or pets in your home.  After using these items, they should be washed thoroughly with soap and water. Clean all high-touch surfaces everyday  High touch surfaces include counters, tabletops, doorknobs, bathroom fixtures, toilets, phones, keyboards, tablets, and bedside tables. Also, clean any surfaces that may have blood, stool, or body fluids on them. Use a household cleaning spray or wipe, according to the label instructions. Labels contain instructions for safe and effective use of the cleaning product including precautions you should take when applying the product, such as wearing gloves and making sure you have good ventilation during use of the product. Monitor your symptoms  Seek prompt medical attention if your illness is worsening (e.g., difficulty breathing). Before seeking care, call your healthcare provider and tell them that you have, or are being evaluated for, COVID-19. Put on a facemask before you enter the facility. These steps will help the healthcare providers office to keep other people in the office or waiting room from getting infected or exposed. Ask your healthcare provider to call the local or [facility] health department. Persons who are placed under active monitoring or facilitated self-monitoring should follow instructions provided by their local health department or occupational health professionals, as appropriate. When working with your local health department check their available hours. If you have a medical emergency and need to call 911, notify the dispatch personnel that you have, or are being evaluated for COVID-19. If possible, put on a facemask before emergency medical services arrive. Discontinuing home isolation  Patients with confirmed COVID-19 should remain under home isolation precautions until the risk of secondary transmission to others is thought to be low based on the above CDC recommendations.  The decision to discontinue home isolation precautions should be made on a case-by-case basis, in consultation with healthcare providers and state and local health departments. Oupatient testing  You can now get tested on an outpatient basis if you are showing symptoms of Covid19 at [facility], [facility], [facility], [facility], or [facility] locations. Go here for locations: [email or web address]     Further resources and [facility] Information Line  Please visit the [facility] website for more information. [email or web address]. [place] residents with questions about COVID-19 can call the [address] at [employer] | [employer].

## 2021-11-13 NOTE — ED NOTES
Discharge instructions were given to the patient by Linnette Esquivel RN. The patient left the Emergency Department ambulatory, alert and oriented and in no acute distress with 0 prescriptions. The patient was encouraged to call or return to the ED for worsening issues or problems and was encouraged to schedule a follow up appointment for continuing care. The patient verbalized understanding of discharge instructions and prescriptions, all questions were answered. The patient has no further concerns at this time.

## 2021-11-13 NOTE — LETTER
[facility] EMERGENCY DEPT  [address] [address] [phone]    Work/School Note    Date: 11/13/2021     To Whom It May concern:    Rupesh Mayorga was evaluated by the following provider(s):  Attending Provider: Beatris Tipton MD  Physician Assistant: Nirmala Meyer, [address] virus is suspected. Per the CDC guidelines we recommend home isolation until the following conditions are all met:    1. At least 10 days have passed since symptoms first appeared and  2. At least 24 hours have passed since last fever without the use of fever-reducing medications and  3.  Symptoms (e.g., cough, shortness of breath) have improved      Sincerely,           Primo Joya RN

## 2021-11-13 NOTE — ED PROVIDER NOTES
EMERGENCY DEPARTMENT HISTORY AND PHYSICAL EXAM      Date: 2021  Patient Name: Hali Blake    History of Presenting Illness     Chief Complaint   Patient presents with    Concern For [id number] (Coronavirus)       History Provided By: Patient    HPI: Hali Blake, 32 y.o. female with no significant past medical history, presents to the ED with cc of concern for COVID-19. The patient just found out that her significant other tested positive for COVID-19. She is around him daily and has had close contact. Over the last week, she has had mild cough, nasal congestion, sore throat, and headache. She is a daily tobacco smoker and thought that this was what was causing her symptoms. She has not tried any over-the-counter medications. She denies fever, chest pain, shortness of breath. She is not immunized against COVID-19. There are no other complaints, changes, or physical findings at this time. PCP: Kya Morales MD    No current facility-administered medications on file prior to encounter. Current Outpatient Medications on File Prior to Encounter   Medication Sig Dispense Refill    predniSONE (STERAPRED DS) 10 mg dose pack Take as prescribed 21 Tablet 0    naproxen (NAPROSYN) 500 mg tablet Take 1 Tablet by mouth two (2) times daily (with meals). 20 Tablet 0    ibuprofen (MOTRIN) 600 mg tablet Take 1 Tablet by mouth every six (6) hours as needed for Pain. 20 Tablet 0    methocarbamoL (Robaxin-750) 750 mg tablet Take 1 Tablet by mouth three (3) times daily as needed for Muscle Spasm(s).  15 Tablet 0       Past History     Past Medical History:  Past Medical History:   Diagnosis Date    ADD (attention deficit disorder)     Asthma     grew out of it per pt       Past Surgical History:  Past Surgical History:   Procedure Laterality Date    HX  SECTION         Family History:  Family History   Problem Relation Age of Onset    Psychiatric Disorder Mother        Social History:  Social History     Tobacco Use    Smoking status: Current Every Day Smoker     Packs/day: 0.25    Smokeless tobacco: Never Used    Tobacco comment: Black and Milds   Substance Use Topics    Alcohol use: Yes     Comment: socially     Drug use: No       Allergies:  No Known Allergies      Review of Systems   Review of Systems   Constitutional: Negative for chills and fever. HENT: Positive for congestion and sore throat. Negative for ear pain. Eyes: Negative for redness and visual disturbance. Respiratory: Positive for cough. Negative for shortness of breath. Cardiovascular: Negative for chest pain and palpitations. Gastrointestinal: Negative for abdominal pain, nausea and vomiting. Genitourinary: Negative for dysuria and hematuria. Musculoskeletal: Negative for back pain and gait problem. Skin: Negative for rash and wound. Neurological: Positive for headaches. Negative for dizziness. Psychiatric/Behavioral: Negative for behavioral problems and confusion. All other systems reviewed and are negative. Physical Exam   Physical Exam  Constitutional:       Appearance: She is not toxic-appearing. Comments: Interactive, well-appearing female. HENT:      Head: Normocephalic and atraumatic. Mouth/Throat:      Mouth: Mucous membranes are moist.   Eyes:      Extraocular Movements: Extraocular movements intact. Pupils: Pupils are equal, round, and reactive to light. Cardiovascular:      Rate and Rhythm: Normal rate and regular rhythm. Heart sounds: Normal heart sounds. No murmur heard. Pulmonary:      Effort: Pulmonary effort is normal. No respiratory distress. Breath sounds: Normal breath sounds. No wheezing or rales. Musculoskeletal:         General: No deformity. Normal range of motion. Cervical back: Normal range of motion and neck supple. Skin:     General: Skin is warm and dry. Neurological:      General: No focal deficit present.       Mental Status: She is alert and oriented to person, place, and time. Psychiatric:         Behavior: Behavior normal.           Diagnostic Study Results     Labs -   No results found for this or any previous visit (from the past 12 hour(s)). Radiologic Studies -   No orders to display     CT Results  (Last 48 hours)    None        CXR Results  (Last 48 hours)    None            Medical Decision Making   I am the first provider for this patient. I reviewed the vital signs, available nursing notes, past medical history, past surgical history, family history and social history. Vital Signs-Reviewed the patient's vital signs. Patient Vitals for the past 12 hrs:   Temp Pulse Resp BP SpO2   11/13/21 1743 98.9 °F (37.2 °C) 69 18 (!) 108/54 100 %         Records Reviewed: Nursing Notes and Old Medical Records      Provider Notes (Medical Decision Making):   DDx: [id number], viral URI    Pt is afebrile and clinically well appearing. Lungs are clear to auscultation and she is maintaining high oxygenation on room air. Will send COVID-19 swab. Discussed quarantine instructions and symptomatic treatment. Discussed follow up and return precautions. ED Course:   Initial assessment performed. The patients presenting problems have been discussed, and they are in agreement with the care plan formulated and outlined with them. I have encouraged them to ask questions as they arise throughout their visit. Disposition:  6:07 PM  The patient has been re-evaluated and is ready for discharge. Reviewed available results with patient. Counseled patient on diagnosis and care plan. Patient has expressed understanding, and all questions have been answered. Patient agrees with plan and agrees to follow up as recommended, or to return to the ED if their symptoms worsen. Discharge instructions have been provided and explained to the patient, along with reasons to return to the ED. PLAN:  1.    Discharge Medication List as of 11/13/2021  6:07 PM        2. Follow-up Information     Follow up With Specialties Details Why Contact Info    Richardine Primrose, MD Family Medicine Schedule an appointment as soon as possible for a visit in 1 week  [address] [facility] [address] [place] [address] EMERGENCY DEPT Emergency Medicine Go to  If symptoms worsen Ryder Henriquez  [phone]        Return to ED if worse     Diagnosis     Clinical Impression:   1. Close exposure to COVID-19 virus            Geryl Councilman.  APRYL Mckeon

## 2021-11-13 NOTE — ED NOTES
Emergency Department Nursing Plan of Care       The Nursing Plan of Care is developed from the Nursing assessment and Emergency Department Attending provider initial evaluation. The plan of care may be reviewed in the ED Provider note.     The Plan of Care was developed with the following considerations:   Patient / Family readiness to learn indicated by:verbalized understanding  Persons(s) to be included in education: patient  Barriers to Learning/Limitations:No    Signed     Philippe Barroso RN    11/13/2021   5:54 PM

## 2021-11-13 NOTE — LETTER
[facility] EMERGENCY DEPT  [address] [address] [phone]    Work/School Note    Date: 11/13/2021     To Whom It May concern:    Ken Mesa was evaluated by the following provider(s):  Attending Provider: Krista Covarrubias MD  Physician Assistant: Mitchell Elizalde, [address] virus is suspected. Per the CDC guidelines we recommend home isolation until the following conditions are all met:    1. At least 10 days have passed since symptoms first appeared and  2. At least 24 hours have passed since last fever without the use of fever-reducing medications and  3.  Symptoms (e.g., cough, shortness of breath) have improved      Sincerely,           Serjio Rubio RN

## 2021-11-15 ENCOUNTER — PATIENT OUTREACH (OUTPATIENT)
Dept: CASE MANAGEMENT | Age: 26
End: 2021-11-15

## 2021-11-15 LAB
SARS-COV-2, XPLCVT: NOT DETECTED
SOURCE, COVRS: NORMAL

## 2021-11-15 NOTE — PROGRESS NOTES
Patient contacted regarding COVID-19 exposure. Discussed COVID-19 related testing which was available at this time. Test results were negative. Patient informed of results, if available? yes. LPN Care Coordinator contacted the patient by telephone to perform post discharge assessment. Call within 2 business days of discharge: Yes Verified name and  with patient as identifiers. Provided introduction to self, and explanation of the CTN/ACM role, and reason for call due to risk factors for infection and/or exposure to COVID-19. Symptoms reviewed with patient who verbalized the following symptoms: cough and nasal congestion, sore throat      Due to no new or worsening symptoms encounter was not routed to provider for escalation. Discussed follow-up appointments. If no appointment was previously scheduled, appointment scheduling offered:  no. [address] follow up appointment(s): No future appointments. Non-[facility] follow up appointment(s): no    Interventions to address risk factors: Obtained and reviewed discharge summary and/or continuity of care documents, Reviewed and followed up on pending diagnostic tests and treatments-COVID 19  and Education of patient/family/caregiver/guardian to support self-management-given contact information to [address]:   Does patient have an Advance Directive: not on file; education provided. Educated patient about risk for severe COVID-19 due to risk factors according to CDC guidelines. LPN CC reviewed discharge instructions, medical action plan and red flag symptoms with the patient who verbalized understanding. Discussed COVID vaccination status: yes. Education provided on COVID-19 vaccination as appropriate. Discussed exposure protocols and quarantine with CDC Guidelines.  Patient was given an opportunity to verbalize any questions and concerns and agrees to contact LPN CC or health care provider for questions related to their healthcare. Reviewed and educated patient on any new and changed medications related to discharge diagnosis     Was patient discharged with a pulse oximeter? no     LPN CC provided contact information. Plan for follow-up call in 5-7 days based on severity of symptoms and risk factors.

## 2021-11-22 ENCOUNTER — PATIENT OUTREACH (OUTPATIENT)
Dept: CASE MANAGEMENT | Age: 26
End: 2021-11-22

## 2021-11-22 NOTE — PROGRESS NOTES
Patient resolved from Transition of Care episode on 11/22/2021. ACM/CTN was unsuccessful at contacting this patient today. Patient/family was provided the following resources and education related to COVID-19 during the initial call:                         Signs, symptoms and red flags related to COVID-19            CDC exposure and quarantine guidelines            [facility] exposure contact - [phone]            Contact for their local Department of Health                 Patient has not had any additional ED or hospital visits. No further outreach scheduled with this CTN/ACM. Episode of Care resolved. Patient has this CTN/ACM contact information if future needs arise.

## 2021-12-21 ENCOUNTER — HOSPITAL ENCOUNTER (EMERGENCY)
Age: 26
Discharge: HOME | End: 2021-12-22
Attending: EMERGENCY MEDICINE
Payer: MEDICAID

## 2021-12-21 VITALS
DIASTOLIC BLOOD PRESSURE: 68 MMHG | HEART RATE: 102 BPM | TEMPERATURE: 100.1 F | WEIGHT: 130 LBS | OXYGEN SATURATION: 99 % | HEIGHT: 62 IN | SYSTOLIC BLOOD PRESSURE: 111 MMHG | RESPIRATION RATE: 18 BRPM | BODY MASS INDEX: 23.92 KG/M2

## 2021-12-21 DIAGNOSIS — R05.9 COUGH: ICD-10-CM

## 2021-12-21 DIAGNOSIS — R68.89 FLU-LIKE SYMPTOMS: ICD-10-CM

## 2021-12-21 DIAGNOSIS — M79.10 MYALGIA: ICD-10-CM

## 2021-12-21 DIAGNOSIS — U07.1 COVID-19: Primary | ICD-10-CM

## 2021-12-21 DIAGNOSIS — R11.0 NAUSEA WITHOUT VOMITING: ICD-10-CM

## 2021-12-21 PROCEDURE — 99284 EMERGENCY DEPT VISIT MOD MDM: CPT

## 2021-12-21 NOTE — Clinical Note
[facility] EMERGENCY DEPT  [address] [address] [phone]    Work/School Note    Date: 12/21/2021     To Whom It May concern:    Charlie Dakin was evaluated by the following provider(s):  Attending Provider: Aleja Mccoy [address] [place] [address] virus is suspected. Per the CDC guidelines we recommend home isolation until the following conditions are all met:    1. At least 10 days have passed since symptoms first appeared and  2. At least 24 hours have passed since last fever without the use of fever-reducing medications and  3.  Symptoms (e.g., cough, shortness of breath) have improved      Sincerely,          Jeffy Liang MD

## 2021-12-21 NOTE — Clinical Note
[facility] EMERGENCY DEPT  [address] [facility] [address] [phone]    Work/School Note    Date: 12/21/2021     To Whom It May concern:    Jaciel Bonner was evaluated by the following provider(s):  Attending Provider: Keke Oconnor [address] [place] [address] virus is suspected. Per the CDC guidelines we recommend home isolation until the following conditions are all met:    1. At least 10 days have passed since symptoms first appeared and  2. At least 24 hours have passed since last fever without the use of fever-reducing medications and  3.  Symptoms (e.g., cough, shortness of breath) have improved      Sincerely,          Dave Estrada MD

## 2021-12-22 ENCOUNTER — APPOINTMENT (OUTPATIENT)
Dept: GENERAL RADIOLOGY | Age: 26
End: 2021-12-22
Attending: EMERGENCY MEDICINE
Payer: MEDICAID

## 2021-12-22 LAB
APPEARANCE UR: ABNORMAL
BACTERIA URNS QL MICRO: NEGATIVE /HPF
BILIRUB UR QL: NEGATIVE
COLOR UR: ABNORMAL
EPITH CASTS URNS QL MICRO: ABNORMAL /LPF
FLUAV RNA SPEC QL NAA+PROBE: NOT DETECTED
FLUBV RNA SPEC QL NAA+PROBE: NOT DETECTED
GLUCOSE UR STRIP.AUTO-MCNC: NEGATIVE MG/DL
HCG UR QL: NEGATIVE
HGB UR QL STRIP: NEGATIVE
KETONES UR QL STRIP.AUTO: NEGATIVE MG/DL
LEUKOCYTE ESTERASE UR QL STRIP.AUTO: NEGATIVE
NITRITE UR QL STRIP.AUTO: NEGATIVE
PH UR STRIP: 6 [PH] (ref 5–8)
PROT UR STRIP-MCNC: NEGATIVE MG/DL
RBC #/AREA URNS HPF: ABNORMAL /HPF (ref 0–5)
SARS-COV-2, COV2: DETECTED
SP GR UR REFRACTOMETRY: 1.02 (ref 1–1.03)
UA: UC IF INDICATED,UAUC: ABNORMAL
UROBILINOGEN UR QL STRIP.AUTO: 0.2 EU/DL (ref 0.2–1)
WBC URNS QL MICRO: ABNORMAL /HPF (ref 0–4)

## 2021-12-22 PROCEDURE — 87636 SARSCOV2 & INF A&B AMP PRB: CPT

## 2021-12-22 PROCEDURE — 74011250637 HC RX REV CODE- 250/637: Performed by: EMERGENCY MEDICINE

## 2021-12-22 PROCEDURE — 81001 URINALYSIS AUTO W/SCOPE: CPT

## 2021-12-22 PROCEDURE — 71045 X-RAY EXAM CHEST 1 VIEW: CPT

## 2021-12-22 PROCEDURE — 81025 URINE PREGNANCY TEST: CPT

## 2021-12-22 RX ORDER — ACETAMINOPHEN 500 MG
1000 TABLET ORAL
Status: COMPLETED | OUTPATIENT
Start: 2021-12-22 | End: 2021-12-22

## 2021-12-22 RX ORDER — IBUPROFEN 600 MG/1
600 TABLET ORAL
Status: COMPLETED | OUTPATIENT
Start: 2021-12-22 | End: 2021-12-22

## 2021-12-22 RX ORDER — ONDANSETRON 4 MG/1
4 TABLET, ORALLY DISINTEGRATING ORAL
Qty: 20 TABLET | Refills: 0 | Status: SHIPPED | OUTPATIENT
Start: 2021-12-22

## 2021-12-22 RX ORDER — ONDANSETRON 4 MG/1
4 TABLET, ORALLY DISINTEGRATING ORAL
Status: COMPLETED | OUTPATIENT
Start: 2021-12-22 | End: 2021-12-22

## 2021-12-22 RX ORDER — ALBUTEROL SULFATE 90 UG/1
2 AEROSOL, METERED RESPIRATORY (INHALATION)
Qty: 18 G | Refills: 0 | Status: SHIPPED | OUTPATIENT
Start: 2021-12-22

## 2021-12-22 RX ADMIN — ONDANSETRON 4 MG: 4 TABLET, ORALLY DISINTEGRATING ORAL at 00:31

## 2021-12-22 RX ADMIN — ACETAMINOPHEN 1000 MG: 500 TABLET ORAL at 00:30

## 2021-12-22 RX ADMIN — IBUPROFEN 600 MG: 600 TABLET ORAL at 00:31

## 2021-12-22 NOTE — ED NOTES
Pt c/o flu like symptoms generalized. Body aches, headaches since yesterday. Pt states son has mary sick, was tested for covid but has not received a call yet about results. Pt  Is febrile today. Rates pain 10/10 . Described as achy    Pt denies nausea, vomiting, diarrhea. Emergency Department Nursing Plan of Care       The Nursing Plan of Care is developed from the Nursing assessment and Emergency Department Attending provider initial evaluation. The plan of care may be reviewed in the ED Provider note.     The Plan of Care was developed with the following considerations:   Patient / Family readiness to learn indicated by:verbalized understanding  Persons(s) to be included in education: patient  Barriers to Learning/Limitations:No    Signed     Jojo Blancas RN    12/22/2021   12:47 AM

## 2021-12-22 NOTE — DISCHARGE INSTRUCTIONS
You were evaluated in the emergency department for cough, body aches, headache, nausea. Your examination was reassuring as was your work-up including chest x-ray. You are positive for COVID-19 infection. Please isolate at home for 10 days as per [facility] guidelines. It will be important for you to follow-up with your primary care physician in 10-14 days. If you develop worsening symptoms such as worsening chest pain, shortness of breath, cough, or fevers not controlled Tylenol or ibuprofen, please return to the emergency department immediately. You can use Tylenol 1000 mg every 6 hours as needed for pain, please do not exceed 4000 mg in a single day. You can use ibuprofen 600 mg every 6 hours as needed for pain, please do not exceed 2400 milligrams in a single day. It was a pleasure taking care of you at [facility] Emergency Department today. We know that when you come to [place], you are entrusting us with your health, comfort, and safety. Our physicians and nurses honor that trust, and we truly appreciate the opportunity to care for you and your loved ones. We also value our feedback. If you receive a survey about your Emergency Department experience today, please fill it out. We care about our patients' feedback, and we listen to what you have to say. Thank you!

## 2021-12-22 NOTE — ED PROVIDER NOTES
EMERGENCY DEPARTMENT HISTORY AND PHYSICAL EXAM      Date: 12/21/2021  Patient Name: Yrn Mcdowell    History of Presenting Illness     Chief Complaint   Patient presents with    Generalized Body Aches    Headache       History Provided By: Patient    HPI: Yrn Mcdowell, 32 y.o. female presents to the ED with cc of cough, generalized body aches, nausea, headache. Symptoms have been present over the past 24 hours. Denies any known sick contacts but she does have a child at home who is also been sick with similar symptoms. She is unvaccinated against COVID-19. Has not received her flu shot this year. Denies any fevers at home but she is noted to be 100.1 °F upon arrival in the ED. Endorses normal p.o. intake and normal activity but complains of significant body aches with nausea, headache, and dry nonproductive cough. Has not taken anything for pain prior to arrival.    There are no other complaints, changes, or physical findings at this time. PCP: Deangelo Zarate MD    No current facility-administered medications on file prior to encounter. Current Outpatient Medications on File Prior to Encounter   Medication Sig Dispense Refill    predniSONE (STERAPRED DS) 10 mg dose pack Take as prescribed (Patient not taking: Reported on 12/21/2021) 21 Tablet 0    naproxen (NAPROSYN) 500 mg tablet Take 1 Tablet by mouth two (2) times daily (with meals). (Patient not taking: Reported on 12/21/2021) 20 Tablet 0    ibuprofen (MOTRIN) 600 mg tablet Take 1 Tablet by mouth every six (6) hours as needed for Pain. (Patient not taking: Reported on 12/21/2021) 20 Tablet 0    methocarbamoL (Robaxin-750) 750 mg tablet Take 1 Tablet by mouth three (3) times daily as needed for Muscle Spasm(s).  (Patient not taking: Reported on 12/21/2021) 15 Tablet 0       Past History     Past Medical History:  Past Medical History:   Diagnosis Date    ADD (attention deficit disorder)     Asthma     grew out of it per pt       Past Surgical History:  Past Surgical History:   Procedure Laterality Date    HX  SECTION         Family History:  Family History   Problem Relation Age of Onset    Psychiatric Disorder Mother        Social History:  Social History     Tobacco Use    Smoking status: Current Every Day Smoker     Packs/day: 0.25    Smokeless tobacco: Never Used    Tobacco comment: Black and Milds   Substance Use Topics    Alcohol use: Yes     Comment: socially     Drug use: Yes     Types: Marijuana       Allergies:  No Known Allergies      Review of Systems   Review of Systems   Constitutional: Positive for fatigue. Negative for appetite change, chills and fever. Respiratory: Positive for cough. Gastrointestinal: Positive for nausea. Negative for abdominal pain and vomiting. Musculoskeletal: Positive for myalgias. Skin: Negative for color change and rash. Neurological: Positive for headaches. All other systems reviewed and are negative. Physical Exam   Physical Exam  Vitals and nursing note reviewed. Constitutional:       General: She is not in acute distress. Appearance: Normal appearance. She is not ill-appearing, toxic-appearing or diaphoretic. HENT:      Head: Normocephalic and atraumatic. Nose: No congestion. Mouth/Throat:      Mouth: Mucous membranes are moist.   Eyes:      Extraocular Movements: Extraocular movements intact. Pupils: Pupils are equal, round, and reactive to light. Neck:      Comments: No meningismus  Cardiovascular:      Rate and Rhythm: Normal rate and regular rhythm. Heart sounds: Normal heart sounds. No murmur heard. Pulmonary:      Effort: Pulmonary effort is normal. No respiratory distress. Breath sounds: Normal breath sounds. No wheezing. Abdominal:      Palpations: Abdomen is soft. Tenderness: There is no abdominal tenderness. There is no guarding or rebound. Musculoskeletal:      Cervical back: Normal range of motion and neck supple. Skin:     General: Skin is warm and dry. Findings: No erythema or rash. Neurological:      General: No focal deficit present. Mental Status: She is alert and oriented to person, place, and time. Psychiatric:         Mood and Affect: Mood normal.         Diagnostic Study Results     Labs -     Labs Reviewed   COVID-19 WITH INFLUENZA A/B - Abnormal; Notable for the following components:       Result Value    SARS-CoV-2 Detected (*)     All other components within normal limits   URINALYSIS W/ REFLEX CULTURE - Abnormal; Notable for the following components:    Appearance CLOUDY (*)     Epithelial cells MODERATE (*)     All other components within normal limits   HCG URINE, QL. - POC       Radiologic Studies -   XR CHEST PORT   Final Result   No acute process identified. CT Results  (Last 48 hours)    None        CXR Results  (Last 48 hours)               12/22/21 0054  XR CHEST PORT Final result    Impression:  No acute process identified. Narrative:  Clinical history: cough, fever   INDICATION:   cough, fever   COMPARISON: None       FINDINGS:   AP portable upright view of the chest demonstrates a stable  cardiopericardial   silhouette. There is no pleural effusion. .There is no focal consolidation. .There   is no pneumothorax. .                  Medical Decision Making   I am the first provider for this patient. I reviewed the vital signs, available nursing notes, past medical history, past surgical history, family history and social history. Vital Signs-Reviewed the patient's vital signs.   Visit Vitals  /68 (BP 1 Location: Left upper arm, BP Patient Position: Sitting)   Pulse (!) 102   Temp 100.1 °F (37.8 °C)   Resp 18   Ht 5' 2\" (1.575 m)   Wt 59 kg (130 lb)   LMP 11/19/2021 (Approximate)   SpO2 99%   BMI 23.78 kg/m²       Records Reviewed: Nursing Notes    Provider Notes (Medical Decision Making):   19-year-old female here with generalized body aches, headache, flulike symptoms with nausea. She has a low-grade temp upon arrival in ED, otherwise vitals are stable. No hypoxia or increased work of breathing. Chest x-ray obtained which is negative for acute process such as pneumonia. COVID-19 swab positive. She is unvaccinated. Will discharge home, encouraged isolation and aspirin as per CDC guidelines. Patient given strict return ED precautions and all questions answered. ED Course:   Initial assessment performed. The patients presenting problems have been discussed, and they are in agreement with the care plan formulated and outlined with them. I have encouraged them to ask questions as they arise throughout their visit. Discharge Note:  The patient has been re-evaluated and is ready for discharge. Reviewed available results with patient. Counseled patient on diagnosis and care plan. Patient has expressed understanding, and all questions have been answered. Patient agrees with plan and agrees to follow up as recommended, or to return to the ED if their symptoms worsen. Discharge instructions have been provided and explained to the patient, along with reasons to return to the ED. Disposition:  Discharge    DISCHARGE PLAN:  1. Discharge Medication List as of 12/22/2021  1:25 AM      START taking these medications    Details   ondansetron (ZOFRAN ODT) 4 mg disintegrating tablet Take 1 Tablet by mouth every eight (8) hours as needed for Nausea or Vomiting., Normal, Disp-20 Tablet, R-0      albuterol (PROVENTIL HFA, VENTOLIN HFA, PROAIR HFA) 90 mcg/actuation inhaler Take 2 Puffs by inhalation every four (4) hours as needed for Wheezing., Normal, Disp-18 g, R-0         CONTINUE these medications which have NOT CHANGED    Details   predniSONE (STERAPRED DS) 10 mg dose pack Take as prescribed, Normal, Disp-21 Tablet, R-0      naproxen (NAPROSYN) 500 mg tablet Take 1 Tablet by mouth two (2) times daily (with meals). , Normal, Disp-20 Tablet, R-0      ibuprofen (MOTRIN) 600 mg tablet Take 1 Tablet by mouth every six (6) hours as needed for Pain., Normal, Disp-20 Tablet, R-0      methocarbamoL (Robaxin-750) 750 mg tablet Take 1 Tablet by mouth three (3) times daily as needed for Muscle Spasm(s). , Normal, Disp-15 Tablet, R-0           2. Follow-up Information     Follow up With Specialties Details Why Contact Info    Jean López MD Family Medicine Schedule an appointment as soon as possible for a visit   [address] [facility] [address]      [facility] EMERGENCY DEPT Emergency Medicine Go to  As needed, If symptoms worsen [address]        3. Return to ED if worse     Diagnosis     Clinical Impression:   1. COVID-19    2. Flu-like symptoms    3. Cough    4. Myalgia    5. Nausea without vomiting        Attestations:  I am the first and primary provider of record for this patient's ED encounter. I personally performed the services described above in this documentation. Ronnie Jane MD    Please note that this dictation was completed with [employer], the [employer] voice recognition software. Quite often unanticipated grammatical, syntax, homophones, and other interpretive errors are inadvertently transcribed by the computer software. Please disregard these errors. Please excuse any errors that have escaped final proofreading. Thank you.

## 2022-07-08 ENCOUNTER — HOSPITAL ENCOUNTER (EMERGENCY)
Age: 27
Discharge: HOME | End: 2022-07-08
Attending: STUDENT IN AN ORGANIZED HEALTH CARE EDUCATION/TRAINING PROGRAM
Payer: MEDICAID

## 2022-07-08 VITALS
RESPIRATION RATE: 18 BRPM | TEMPERATURE: 98 F | SYSTOLIC BLOOD PRESSURE: 116 MMHG | WEIGHT: 120 LBS | HEIGHT: 62 IN | DIASTOLIC BLOOD PRESSURE: 58 MMHG | BODY MASS INDEX: 22.08 KG/M2 | OXYGEN SATURATION: 100 % | HEART RATE: 82 BPM

## 2022-07-08 DIAGNOSIS — J00 ACUTE NASOPHARYNGITIS: Primary | ICD-10-CM

## 2022-07-08 DIAGNOSIS — Z20.822 SUSPECTED COVID-19 VIRUS INFECTION: ICD-10-CM

## 2022-07-08 LAB
SARS-COV-2, XPLCVT: DETECTED
SOURCE, COVRS: ABNORMAL

## 2022-07-08 PROCEDURE — 99283 EMERGENCY DEPT VISIT LOW MDM: CPT

## 2022-07-08 PROCEDURE — U0005 INFEC AGEN DETEC AMPLI PROBE: HCPCS

## 2022-07-08 RX ORDER — ACETAMINOPHEN 500 MG
1000 TABLET ORAL
Qty: 20 TABLET | Refills: 0 | Status: SHIPPED | OUTPATIENT
Start: 2022-07-08

## 2022-07-08 RX ORDER — IBUPROFEN 800 MG/1
800 TABLET ORAL
Qty: 20 TABLET | Refills: 0 | Status: SHIPPED | OUTPATIENT
Start: 2022-07-08

## 2022-07-08 NOTE — DISCHARGE INSTRUCTIONS
It was a pleasure taking care of you at [facility] Emergency Department today. We know that when you come to [facility], you are entrusting us with your health, comfort, and safety. Our physicians and nurses honor that trust, and we truly appreciate the opportunity to care for you and your loved ones. We also value our feedback. If you receive a survey about your Emergency Department experience today, please fill it out. We care about our patients' feedback, and we listen to what you have to say. Thank you!

## 2022-07-08 NOTE — ED TRIAGE NOTES
Pt to ER for c/o flu like symptoms. Pt sts she was around a group of friends over the weekend. Pt c/o chest congestion, feeling warm, and cough. Pt is not vaccinated for covid.   VSS, in no acute distress

## 2022-07-08 NOTE — Clinical Note
[facility] EMERGENCY DEPT  [address] [address] [phone]    Work/School Note    Date: 7/8/2022     To Whom It May concern:    Elizabeth Scherer was evaluated by the following provider(s):  Attending Provider: Deandra Koenig MD  Physician Assistant: Frida Alvaradoegal virus is suspected. Per the CDC guidelines we recommend home isolation until the following conditions are all met:    1. At least five days have passed since symptoms first appeared and/or had a close exposure,   2. After home isolation for five days, wearing a mask around others for the next five days,  3. At least 24 have passed since last fever without the use of fever-reducing medications and  4.  Symptoms (eg cough, shortness of breath) have improved      Sincerely,          Elder Pacheco PA-C
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
## 2022-07-11 ENCOUNTER — PATIENT OUTREACH (OUTPATIENT)
Dept: CASE MANAGEMENT | Age: 27
End: 2022-07-11

## 2022-07-11 NOTE — PROGRESS NOTES
Patient contacted regarding COVID-19 diagnosis. Discussed COVID-19 related testing which was available at this time. Test results were positive. Patient informed of results, if available? yes. LPN Care Coordinator contacted the patient by telephone to perform post discharge assessment. Call within 2 business days of discharge: Yes Verified name and  with patient as identifiers. Provided introduction to self, and explanation of the CTN/ACM role, and reason for call due to risk factors for infection and/or exposure to COVID-19. Symptoms reviewed with patient who verbalized the following symptoms: no new symptoms and no worsening symptoms      Due to no new or worsening symptoms encounter was not routed to provider for escalation. Discussed follow-up appointments. If no appointment was previously scheduled, appointment scheduling offered:  no. [facility] follow up appointment(s): No future appointments. Non-[facility] follow up appointment(s): n/a    Interventions to address risk factors: Reviewed and followed up on pending diagnostic tests and treatments-COVID-19 education  and Education of patient/family/caregiver/guardian to support self-management-patient to follow up with PCP as needed. Advance Care Planning:   Does patient have an Advance Directive: not on file; education provided. Educated patient about risk for severe COVID-19 due to risk factors according to CDC guidelines. LPN CC reviewed discharge instructions, medical action plan and red flag symptoms with the patient who verbalized understanding. Discussed COVID vaccination status: yes. Education provided on COVID-19 vaccination as appropriate. Discussed exposure protocols and quarantine with CDC Guidelines. Patient was given an opportunity to verbalize any questions and concerns and agrees to contact LPN CC or health care provider for questions related to their healthcare.     Reviewed and educated patient on any new and changed medications related to discharge diagnosis     Was patient discharged with a pulse oximeter? no    LPN CC provided contact information. Plan for follow-up call in 5-7 days based on severity of symptoms and risk factors.

## 2022-07-19 ENCOUNTER — PATIENT OUTREACH (OUTPATIENT)
Dept: CASE MANAGEMENT | Age: 27
End: 2022-07-19

## 2022-07-19 NOTE — PROGRESS NOTES
Patient resolved from [address] Transitions episode on 7/19/2022. Discussed COVID-19 related testing which was available at this time. Test results were positive. Patient informed of results, if available? yes     Patient/family has been provided the following resources and education related to COVID-19:                         Signs, symptoms and red flags related to COVID-19            [facility] exposure and quarantine guidelines            [facility] exposure contact - [phone]            Contact for their local Department of Health                 Patient currently reports that the following symptoms have improved:  cough and congestion . No further outreach scheduled with this CTN/ACM/LPN/HC/ MA. Episode of Care resolved. Patient has this CTN/ACM/LPN/HC/MA contact information if future needs arise.

## 2022-10-03 VITALS
OXYGEN SATURATION: 98 % | TEMPERATURE: 97.8 F | HEIGHT: 62 IN | RESPIRATION RATE: 15 BRPM | WEIGHT: 120 LBS | BODY MASS INDEX: 22.08 KG/M2 | SYSTOLIC BLOOD PRESSURE: 110 MMHG | DIASTOLIC BLOOD PRESSURE: 66 MMHG | HEART RATE: 87 BPM

## 2022-10-03 PROCEDURE — 99283 EMERGENCY DEPT VISIT LOW MDM: CPT | Performed by: EMERGENCY MEDICINE

## 2022-10-04 ENCOUNTER — APPOINTMENT (OUTPATIENT)
Dept: GENERAL RADIOLOGY | Age: 27
End: 2022-10-04
Attending: EMERGENCY MEDICINE
Payer: MEDICAID

## 2022-10-04 ENCOUNTER — HOSPITAL ENCOUNTER (EMERGENCY)
Age: 27
Discharge: HOME | End: 2022-10-04
Attending: EMERGENCY MEDICINE | Admitting: EMERGENCY MEDICINE
Payer: MEDICAID

## 2022-10-04 DIAGNOSIS — S63.614A SPRAIN OF RIGHT RING FINGER, UNSPECIFIED SITE OF DIGIT, INITIAL ENCOUNTER: Primary | ICD-10-CM

## 2022-10-04 DIAGNOSIS — S60.041A CONTUSION OF RIGHT RING FINGER WITHOUT DAMAGE TO NAIL, INITIAL ENCOUNTER: ICD-10-CM

## 2022-10-04 DIAGNOSIS — Z72.0 TOBACCO ABUSE: ICD-10-CM

## 2022-10-04 PROCEDURE — 73140 X-RAY EXAM OF FINGER(S): CPT

## 2022-10-04 PROCEDURE — 74011250637 HC RX REV CODE- 250/637: Performed by: EMERGENCY MEDICINE

## 2022-10-04 RX ORDER — NAPROXEN 500 MG/1
500 TABLET ORAL 2 TIMES DAILY WITH MEALS
Qty: 20 TABLET | Refills: 0 | Status: SHIPPED | OUTPATIENT
Start: 2022-10-04

## 2022-10-04 RX ORDER — NAPROXEN 250 MG/1
500 TABLET ORAL ONCE
Status: COMPLETED | OUTPATIENT
Start: 2022-10-04 | End: 2022-10-04

## 2022-10-04 RX ADMIN — NAPROXEN 500 MG: 250 TABLET ORAL at 00:42
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
## 2022-10-04 NOTE — ED NOTES
Discharge instructions were given to the patient by Froylan Lin RN. The patient left the Emergency Department ambulatory, alert and oriented and in no acute distress with one prescriptions. The patient was encouraged to call or return to the ED for worsening issues or problems and was encouraged to schedule a follow up appointment for continuing care. The patient verbalized understanding of discharge instructions and prescriptions, all questions were answered. The patient has no further concerns at this time.

## 2022-10-04 NOTE — DISCHARGE INSTRUCTIONS
Thank You! It was a pleasure taking care of you in our Emergency Department today. We know that when you come to [address] [place] [address], you are entrusting us with your health, comfort, and safety. Our physicians and nurses honor that trust, and truly appreciate the opportunity to care for you and your loved ones. We also value your feedback. If you receive a survey about your Emergency Department experience today, please fill it out. We care about our patients' feedback, and we listen to what you have to say. Thank you. Dr. Mel Salgado M.D.      ____________________________________________________________________  I have included a copy of your lab results and/or radiologic studies from today's visit so you can have them easily available at your follow-up visit. We hope you feel better and please do not hesitate to contact the ED if you have any questions at all! No results found for this or any previous visit (from the past 12 hour(s)). XR 4TH FINGER RT MIN 2 V   Final Result   No acute abnormality. CT Results  (Last 48 hours)      None          The exam and treatment you received in the Emergency Department were for an urgent problem and are not intended as complete care. It is important that you follow up with a doctor, nurse practitioner, or physician assistant for ongoing care. If your symptoms become worse or you do not improve as expected and you are unable to reach your usual health care provider, you should return to the Emergency Department. We are available 24 hours a day. Please take your discharge instructions with you when you go to your follow-up appointment. If a prescription has been provided, please have it filled as soon as possible to prevent a delay in treatment. Read the entire medication instruction sheet provided to you by the pharmacy.  If you have any questions or reservations about taking the medication due to side effects or interactions with other medications, please call your primary care physician or contact the ER to speak with the charge nurse. Please make an appointment with your family doctor or the physician you were referred to for follow-up of this visit as instructed on your discharge paperwork. Return to the ER if you are unable to be seen or if you are unable to be seen in a timely manner. If you have any problem arranging the follow-up visit, contact the Emergency Department immediately.
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
## 2023-05-16 ENCOUNTER — HOSPITAL ENCOUNTER (EMERGENCY)
Facility: HOSPITAL | Age: 28
Discharge: HOME | End: 2023-05-17
Attending: STUDENT IN AN ORGANIZED HEALTH CARE EDUCATION/TRAINING PROGRAM
Payer: MEDICAID

## 2023-05-16 DIAGNOSIS — R52 INTRACTABLE PAIN: Primary | ICD-10-CM

## 2023-05-16 DIAGNOSIS — K08.89 PAIN, DENTAL: ICD-10-CM

## 2023-05-16 PROCEDURE — 99283 EMERGENCY DEPT VISIT LOW MDM: CPT

## 2023-05-16 ASSESSMENT — PAIN DESCRIPTION - DESCRIPTORS: DESCRIPTORS: ACHING

## 2023-05-16 ASSESSMENT — PAIN - FUNCTIONAL ASSESSMENT: PAIN_FUNCTIONAL_ASSESSMENT: 0-10

## 2023-05-16 ASSESSMENT — PAIN SCALES - GENERAL: PAINLEVEL_OUTOF10: 10

## 2023-05-16 ASSESSMENT — PAIN DESCRIPTION - LOCATION: LOCATION: TEETH

## 2023-05-17 VITALS
BODY MASS INDEX: 22.08 KG/M2 | RESPIRATION RATE: 18 BRPM | HEIGHT: 62 IN | SYSTOLIC BLOOD PRESSURE: 111 MMHG | DIASTOLIC BLOOD PRESSURE: 64 MMHG | HEART RATE: 73 BPM | OXYGEN SATURATION: 99 % | TEMPERATURE: 97.8 F | WEIGHT: 120 LBS

## 2023-05-17 PROCEDURE — 6370000000 HC RX 637 (ALT 250 FOR IP): Performed by: PHYSICIAN ASSISTANT

## 2023-05-17 RX ORDER — ACETAMINOPHEN 325 MG/1
650 TABLET ORAL 4 TIMES DAILY
Qty: 56 TABLET | Refills: 0 | Status: SHIPPED | OUTPATIENT
Start: 2023-05-17 | End: 2023-05-24

## 2023-05-17 RX ORDER — ACETAMINOPHEN 500 MG
1000 TABLET ORAL ONCE
Status: COMPLETED | OUTPATIENT
Start: 2023-05-17 | End: 2023-05-17

## 2023-05-17 RX ORDER — FAMOTIDINE 10 MG
10 TABLET ORAL 2 TIMES DAILY
COMMUNITY

## 2023-05-17 RX ADMIN — DIPHENHYDRAMINE HYDROCHLORIDE: 12.5 LIQUID ORAL at 01:05

## 2023-05-17 RX ADMIN — ACETAMINOPHEN 1000 MG: 500 TABLET ORAL at 01:03

## 2023-05-17 ASSESSMENT — PAIN DESCRIPTION - LOCATION: LOCATION: MOUTH

## 2023-05-17 ASSESSMENT — LIFESTYLE VARIABLES
HOW MANY STANDARD DRINKS CONTAINING ALCOHOL DO YOU HAVE ON A TYPICAL DAY: PATIENT DOES NOT DRINK
HOW OFTEN DO YOU HAVE A DRINK CONTAINING ALCOHOL: NEVER

## 2023-05-17 ASSESSMENT — PAIN DESCRIPTION - DESCRIPTORS: DESCRIPTORS: ACHING

## 2023-05-17 ASSESSMENT — PAIN SCALES - GENERAL: PAINLEVEL_OUTOF10: 10

## 2023-05-17 ASSESSMENT — PAIN DESCRIPTION - ORIENTATION: ORIENTATION: LEFT

## 2023-05-17 NOTE — ED PROVIDER NOTES
[facility] EMERGENCY DEPT  EMERGENCY DEPARTMENT ENCOUNTER       Pt Name: Mara James  MRN: [id number]  [place] 1995  Date of Evaluation: 2023  Provider: PIERCE Art   PCP: Carrie Bruce MD  Note Started: 12:37 AM 23     CHIEF COMPLAINT       Chief Complaint   Patient presents with    Dental Pain        HISTORY OF PRESENT ILLNESS: 1 or more elements      History From: Patient  None     Maar James is a 32 y.o. female with a history of GERD, who is 33 weeks pregnant, presents emerged part dental pain. She reports her left maxillary wisdom tooth has been hurting over the last 3 days, however has become intolerable despite using clove oil and apple sauce for pain relief. She denies other acute symptoms to include fevers, headaches, dizziness, rash, tinnitus, ear pain, sore throat, difficulty breathing, neck pain, nausea, vomiting abdominal pain, diarrhea, chest pain, urinary symptoms. Nursing Notes were all reviewed and agreed with or any disagreements were addressed in the HPI. REVIEW OF SYSTEMS      Review of Systems     Positives and Pertinent negatives as per HPI.     PAST HISTORY     Past Medical History:  Past Medical History:   Diagnosis Date    ADD (attention deficit disorder)     Asthma     grew out of it per pt       Past Surgical History:  Past Surgical History:   Procedure Laterality Date     SECTION         Family History:  Family History   Problem Relation Age of Onset    Psychiatric Disorder Mother        Social History:  Social History     Tobacco Use    Smoking status: Every Day     Packs/day: 0.25     Types: Cigarettes    Smokeless tobacco: Never   Substance Use Topics    Alcohol use: Yes    Drug use: Yes     Types: Marijuana (Weed)       Allergies:  No Known Allergies    CURRENT MEDICATIONS      Previous Medications    FAMOTIDINE (PEPCID) 10 MG TABLET    Take 1 tablet by mouth 2 times daily       PHYSICAL EXAM      Vitals:    23 2257   BP: 111/64   Pulse: (!)

## 2023-05-17 NOTE — ED NOTES
Pt presents to ED ambulatory complaining of left upper dental pain x3 days. Pt is alert and oriented x 4, RR even and unlabored, skin is warm and dry. Assessment completed and pt updated on plan of care. Call bell in reach. Emergency Department Nursing Plan of Care       The Nursing Plan of Care is developed from the Nursing assessment and Emergency Department Attending provider initial evaluation. The plan of care may be reviewed in the ED Provider note.     The Plan of Care was developed with the following considerations:   Patient / Family readiness to learn indicated by:verbalized understanding  Persons(s) to be included in education: patient  Barriers to Learning/Limitations:No    Signed         Krishna Hood RN  05/17/23 0009

## 2023-05-17 NOTE — ED NOTES
Discharge instructions were given to the patient by [facility]. The patient left the Emergency Department ambulatory, alert and oriented and in no acute distress with 1 prescriptions. The patient was encouraged to call or return to the ED for worsening issues or problems and was encouraged to schedule a follow up appointment for continuing care. The patient verbalized understanding of discharge instructions and prescriptions, all questions were answered. The patient has no further concerns at this time.         Mary Crespo RN  05/17/23 0111

## 2023-05-17 NOTE — DISCHARGE INSTRUCTIONS
Follow-up with your established dental appointment on the 25th of this month. You may also follow-up with one of the dental clinics below. You may return to the ED if you develop any concerning symptoms.

## 2023-05-22 RX ORDER — ALBUTEROL SULFATE 90 UG/1
2 AEROSOL, METERED RESPIRATORY (INHALATION) EVERY 4 HOURS PRN
COMMUNITY
Start: 2021-12-22

## 2023-05-22 RX ORDER — ACETAMINOPHEN 500 MG
1000 TABLET ORAL EVERY 6 HOURS PRN
COMMUNITY
Start: 2022-07-08

## 2023-05-22 RX ORDER — NAPROXEN 500 MG/1
500 TABLET ORAL 2 TIMES DAILY WITH MEALS
COMMUNITY
Start: 2022-10-04

## 2023-05-22 RX ORDER — IBUPROFEN 800 MG/1
800 TABLET ORAL EVERY 6 HOURS PRN
COMMUNITY
Start: 2022-07-08

## 2023-05-22 RX ORDER — ONDANSETRON 4 MG/1
4 TABLET, ORALLY DISINTEGRATING ORAL EVERY 8 HOURS PRN
COMMUNITY
Start: 2021-12-22

## 2023-09-04 ENCOUNTER — HOSPITAL ENCOUNTER (EMERGENCY)
Facility: HOSPITAL | Age: 28
Discharge: HOME | End: 2023-09-04
Payer: MEDICAID

## 2023-09-04 VITALS
WEIGHT: 141.09 LBS | HEART RATE: 99 BPM | RESPIRATION RATE: 17 BRPM | SYSTOLIC BLOOD PRESSURE: 116 MMHG | BODY MASS INDEX: 25.96 KG/M2 | TEMPERATURE: 98.8 F | DIASTOLIC BLOOD PRESSURE: 63 MMHG | OXYGEN SATURATION: 99 % | HEIGHT: 62 IN

## 2023-09-04 DIAGNOSIS — J06.9 ACUTE UPPER RESPIRATORY INFECTION: Primary | ICD-10-CM

## 2023-09-04 LAB
DEPRECATED S PYO AG THROAT QL EIA: NEGATIVE
FLUAV RNA SPEC QL NAA+PROBE: NOT DETECTED
FLUBV RNA SPEC QL NAA+PROBE: NOT DETECTED
SARS-COV-2 RNA RESP QL NAA+PROBE: NOT DETECTED

## 2023-09-04 PROCEDURE — 87070 CULTURE OTHR SPECIMN AEROBIC: CPT

## 2023-09-04 PROCEDURE — 99283 EMERGENCY DEPT VISIT LOW MDM: CPT

## 2023-09-04 PROCEDURE — 87880 STREP A ASSAY W/OPTIC: CPT

## 2023-09-04 PROCEDURE — 87636 SARSCOV2 & INF A&B AMP PRB: CPT

## 2023-09-04 PROCEDURE — 87147 CULTURE TYPE IMMUNOLOGIC: CPT

## 2023-09-04 RX ORDER — GUAIFENESIN 600 MG/1
600 TABLET, EXTENDED RELEASE ORAL 2 TIMES DAILY
Qty: 6 TABLET | Refills: 0 | Status: SHIPPED | OUTPATIENT
Start: 2023-09-04 | End: 2023-09-07

## 2023-09-04 ASSESSMENT — PAIN SCALES - GENERAL: PAINLEVEL_OUTOF10: 7

## 2023-09-04 ASSESSMENT — PAIN DESCRIPTION - PAIN TYPE: TYPE: ACUTE PAIN

## 2023-09-04 ASSESSMENT — PAIN DESCRIPTION - LOCATION: LOCATION: THROAT

## 2023-09-04 ASSESSMENT — PAIN - FUNCTIONAL ASSESSMENT: PAIN_FUNCTIONAL_ASSESSMENT: 0-10

## 2023-09-04 ASSESSMENT — PAIN DESCRIPTION - DESCRIPTORS: DESCRIPTORS: SORE

## 2023-09-05 NOTE — ED NOTES
Patient  given copy of dc instructions and 1 script(s). Patient verbalized understanding of instructions and script (s). Patient given a current medication reconciliation form and verbalized understanding of their medications. Patient verbalized understanding of the importance of discussing medications with his or her physician or clinic they will be following up with. Patient alert and oriented and in no acute distress. Patient discharged home ambulatory.        Devin Cavazos RN  09/04/23 [id number]

## 2023-09-05 NOTE — ED PROVIDER NOTES
summary): Nursing Notes    MDM (CC/HPI Summary, DDx, ED Course, Reassessment, Disposition Considerations -Tests not done, Shared Decision Making, Pt Expectation of Test or Tx.):      Kal Segovia is a 29 y.o. female who is 8 weeks pregnant who presents c/o sore throat, cough, chills, myalgias, x 2 days. Symptoms are consistent with an uncomplicated viral URI. DDx: bacterial sinusitis vs. Rhinosinusitis, allergies, Viral pharyngitis v strep, COVID, flu. Symptomatic therapy suggested: guaifenasin. Increase fluids, use vaporizer, stay in steamy bathroom tid 15 min prn severe cough, tylenol as needed, rest, avoid smoky areas. Lack of antibiotic effectiveness discussed with her. Results of covid, flu, and strep swabs were negative and were reviewed with the patient. All medications were reviewed with the patient; will d/c home with guaifenasin tablets. All of pt's questions and concerns were addressed. Patient was instructed and agrees to follow up with PCP, as well as to return to the ED upon further deterioration. Patient is ready to go home. FINAL IMPRESSION     1.  Acute upper respiratory infection          DISPOSITION/PLAN   DISPOSITION Decision To Discharge 09/04/2023 08:25:02 PM             PATIENT REFERRED TO:  [unfilled]      DISCHARGE MEDICATIONS:     Medication List        START taking these medications      guaiFENesin 600 MG extended release tablet  Commonly known as: MUCINEX  Take 1 tablet by mouth 2 times daily for 3 days            ASK your doctor about these medications      * acetaminophen 500 MG tablet  Commonly known as: TYLENOL     * acetaminophen 325 MG tablet  Commonly known as: Aminofen  Take 2 tablets by mouth in the morning, at noon, in the evening, and at bedtime for 7 days     albuterol sulfate  (90 Base) MCG/ACT inhaler  Commonly known as: PROVENTIL;VENTOLIN;PROAIR     famotidine 10 MG tablet  Commonly known as: PEPCID     ibuprofen 800 MG tablet  Commonly known as:

## 2023-09-06 LAB
BACTERIA SPEC CULT: ABNORMAL
BACTERIA SPEC CULT: ABNORMAL
SERVICE CMNT-IMP: ABNORMAL